# Patient Record
Sex: FEMALE | Race: WHITE
[De-identification: names, ages, dates, MRNs, and addresses within clinical notes are randomized per-mention and may not be internally consistent; named-entity substitution may affect disease eponyms.]

---

## 2017-01-13 ENCOUNTER — HOSPITAL ENCOUNTER (EMERGENCY)
Dept: HOSPITAL 74 - JER | Age: 47
LOS: 1 days | Discharge: HOME | End: 2017-01-14
Payer: COMMERCIAL

## 2017-01-13 VITALS — TEMPERATURE: 98.3 F | HEART RATE: 94 BPM | SYSTOLIC BLOOD PRESSURE: 126 MMHG | DIASTOLIC BLOOD PRESSURE: 88 MMHG

## 2017-01-13 VITALS — BODY MASS INDEX: 32.2 KG/M2

## 2017-01-13 DIAGNOSIS — R73.03: ICD-10-CM

## 2017-01-13 DIAGNOSIS — Z87.42: ICD-10-CM

## 2017-01-13 DIAGNOSIS — J45.909: ICD-10-CM

## 2017-01-13 DIAGNOSIS — K57.20: Primary | ICD-10-CM

## 2017-01-13 LAB
ALBUMIN SERPL-MCNC: 3.5 G/DL (ref 3.4–5)
ALP SERPL-CCNC: 67 U/L (ref 45–117)
ALT SERPL-CCNC: 25 U/L (ref 12–78)
ANION GAP SERPL CALC-SCNC: 7 MMOL/L (ref 8–16)
AST SERPL-CCNC: 11 U/L (ref 15–37)
BILIRUB SERPL-MCNC: 0.5 MG/DL (ref 0.2–1)
CALCIUM SERPL-MCNC: 8 MG/DL (ref 8.5–10.1)
CO2 SERPL-SCNC: 27 MMOL/L (ref 21–32)
CREAT SERPL-MCNC: 0.5 MG/DL (ref 0.55–1.02)
GLUCOSE SERPL-MCNC: 100 MG/DL (ref 74–106)
PROT SERPL-MCNC: 7 G/DL (ref 6.4–8.2)

## 2017-01-13 NOTE — PDOC
[id number]


ABD PAIN


Time Seen by Provider: 17 19:45





- History of Present Illness


Initial Comments: 


17 20:42


CHIEF COMPLAINT: Lower abdominal pain





HISTORY OF PRESENT ILLNESS: 46-year-old female with history of PID, 2 D&C, 

prediabetes, and asthma, presents to ED with pain in his lower abdomen. Patient 

states that 4 days ago she began feeling this discomfort. "I thought I was 

ovulating, but then it felt like gas in the stomach like I had after having a c-

section or something, now it feels like it's in my ovaries. Even when I sit 

down it feels like something is ready to pop."  She denies any sexual activity 

in the last 2 years but does report having chlamydia and PID "8-10 years ago."  

She also reports losing 13 pounds in the 2 months to help control her 

prediabetes.  Her LMP was .  





No recent travel or sick contacts. 





PAST MEDICAL HISTORY: Denies past medical history





FAMILY HISTORY: Denies





SOCIAL HISTORY: Lives at home with family.   Current smoker, 6-7 cigarettes 

daily.  Occasional alcoholuse.  Marijuana use once a week.





SURGICAL HISTORY: 2 D&C





ALLERGIES: No known drug allergies





REVIEW OF SYSTEMS


General/Constitutional: Intentional weight loss.  Denies fever or chills. 

Denies weakness, weight change.





HEENT: Denies change in vision. Denies ear pain or discharge. Denies sore 

throat.





Cardiovascular: Denies chest pain or shortness of breath.





Respiratory: Denies cough, wheezing, or hemoptysis.





Gastrointestinal: Denies nausea, vomiting, diarrhea or constipation. Denies 

rectal bleeding.





Genitourinary: Denies dysuria, frequency, or change in urination.





Musculoskeletal: Denies joint or muscle swelling or pain. Denies neck or back 

pain.





Skin and breasts: Denies rash or easy bruising.





Neurologic: Denies headache, vertigo, loss of consciousness, or loss of 

sensation.





Endocrine: Denies increased thirst. Denies abnormal weight change.





Hematologic/Lymphatic: Denies anemia, easy bleeding, or history of blood clots.





Allergic/Immunologic: Denies hives or skin allergy. Denies latex allergy.











PHYSICAL EXAM


General Appearance: Well-appearing, appropriately dressed.  No apparent distress

, no intoxication.





HEENT: EOMI, PERRLA, normal ENT inspection, normal voice, TMs normal, pharynx 

normal.  No conjunctival pallor.  No photophobia, scleral icterus.





Neck: Supple.  Trachea midline. No tenderness, rigidity, carotid bruit, stridor

, lymphadenopathy, or thyromegaly. 





Respiratory/Chest: Lungs CTAB.  No shortness of breath, chest tenderness, 

respiratory distress, accessory muscle use. No crackles, rales, rhonchi, stridor

, wheezing, dullness





Cardiovascular: RRR. S1, S2.  No JVD, murmur, bradycardia, tachycardia.





Vascular Pulses: Dorsalis-Pedis (R): 2+, Dorsalis-Pedis (L): 2+





Gastrointestinal/Abdominal: Tenderness to RLQ and LLQ on palpation. Normal 

bowel sounds.  Abdomen soft, non-distended.  No tenderness or rebound 

tenderness. No  organomegaly, pulsatile mass, guarding, hernia, hepatomegaly, 

splenomegaly.





Pelvic:


Left adnexal tenderness vs LLQ tenderness.


External genitalia normal without lesions.


Vaginal vault is clear without blood or discharge.


Cervix is long and closed.  No cervical motion tenderness.


Uterus is nontender and normal in size.





Lymphatic: No adenopathy, tenderness.





Musculoskeletal/Extremities:  Normal inspection. FROM of all extremities, 

normal capillary refill.  Pelvis Stable.  No CVA tenderness. No tenderness to 

extremities, pedal edema, swelling, erythema or deformity.





Integumentary: Appropriate color, dry, warm.  No cyanosis, erythema, jaundice 

or rash





Neurologic: CNs II-XII intact. Fully oriented, alert.  Appropriate mood/affect. 

Motor strength 5/5.  No appreciable EOM palsy, facial droop or sensory deficit.





17 00:17








Past History





- Past Medical History


Allergies/Adverse Reactions: 


 Allergies











Allergy/AdvReac Type Severity Reaction Status Date / Time


 


No Known Drug Allergies Allergy   Verified 17 18:48


 


peanut Allergy   Verified 17 18:52











Home Medications: 


Ambulatory Orders





No Home Medications 0 dose .ROUTE UTDICT 11/10/13 


Ciprofloxacin [Cipro -] 500 mg PO Q12H #20 tablet 17 


Metronidazole 500 mg PO TID #30 tablet 17 


Ondansetron [Zofran *Odt*] 8 mg SL BID PRN #10 od.tablet 17 








Anemia: No


Asthma: Yes


Cancer: No


Cardiac Disorders: No


CVA: No


COPD: No


CHF: No


Dementia: No


Diabetes: Yes (H/O , BS UNDER CONTROL WITH DIET/EXERCISE)


GI Disorders: No


 Disorders: No


HTN: No


Hypercholesterolemia: No


Liver Disease: Yes ("fatty liver")


Psychiatric Problems: Yes (ANXIETY)


Seizures: No


Thyroid Disease: No





- Surgical History


Abdominal Surgery: Yes (D&C FOR MISSED ABX2)


Appendectomy: No


Cardiac Surgery: No


Cholecystectomy: No


Lung Surgery: No


Neurologic Surgery: No


Orthopedic Surgery: No





- Reproductive History


 (#): 11


Para: 5


Therapeutic (s) & number: Yes (3)


Spontaneous : 3





- Immunization History


Immunization Up to Date: Yes





- Psycho/Social/Smoking Cessation Hx


Anxiety: No


Suicidal Ideation: No


Smoking Status: Yes


Smoking History: Current every day smoker


Have you smoked in the past 12 months: Yes


Number of Cigarettes Smoked Daily: 6


If you are a former smoker, when did you quit?: 


Information on smoking cessation initiated: Yes


'Breaking Loose' booklet given: 17


Hx Alcohol Use: No


Drug/Substance Use Hx: No


Substance Use Type: None


Hx Substance Use Treatment: No





*Physical Exam





- Vital Signs


 Last Vital Signs











Temp Pulse Resp BP Pulse Ox


 


 98.3 F   94 H  18   126/88   100 


 


 17 18:48  17 18:48  17 18:48  17 18:48  17 18:48














ED Treatment Course





- LABORATORY


CBC & Chemistry Diagram: 


 17 20:37





Medical Decision Making





- Medical Decision Making


17 22:17


6-year-old female with history of PID, 2 D&C, prediabetes, and asthma, presents 

to ED with pain in his lower abdomen. 





-CBC, CMP, lipase, UA, UCx, Upreg


-Abdomen & pelvis CT with contrast 





17 11:45





Urine pregnancy was not sent.  Patient agitated, states she would like to leave 

[facility].  Advised patient to remain in ED for CT, will order serum preg for quick 

results to send patient to CT. 





Serum pregnancy negative.  Patient to go to CT.  








CT suggestive of dievrticulitis. 





Will dischrage to home with Flagyl and Cipro. 





Advised patient to take meds as prescribed and f/u with GI.  Advised patient of 

signs and symptoms for return to ED; patient verbalized understnading and 

agrees to plan. 








*DC/Admit/Observation/Transfer


Diagnosis at time of Disposition: 


Diverticulitis


Qualifiers:


 Diverticulitis site: unspecified part of intestinal tract Diverticulitis 

bleeding: without bleeding Diverticulitis complication: without perforation or 

abscess Qualified Code(s): K57.92 - Diverticulitis of intestine, part 

unspecified, without perforation or abscess without bleeding





- Discharge Dispostion


Disposition: HOME


Admit: No





- Prescriptions


Prescriptions: 


Ciprofloxacin [Cipro -] 500 mg PO Q12H #20 tablet


Metronidazole 500 mg PO TID #30 tablet


Ondansetron [Zofran *Odt*] 8 mg SL BID PRN #10 od.tablet


 PRN Reason: Nausea And/Or Vomiting





- Referrals


Referrals: 


Omar Ludwig [Primary Care Provider] - 





- Patient Instructions


Printed Discharge Instructions:  DI for Diverticulitis


Additional Instructions: 


Please follow up with your primary care doctor next week.  If you experience 

fever, nausea, vomiting, diarrhea, rectal bleeding, dark stool, or any new or 

worsening symptoms, please return to the ER immediately.

## 2017-07-14 ENCOUNTER — HOSPITAL ENCOUNTER (EMERGENCY)
Dept: HOSPITAL 74 - JER | Age: 47
Discharge: HOME | End: 2017-07-14
Payer: COMMERCIAL

## 2017-07-14 VITALS — TEMPERATURE: 98.6 F | DIASTOLIC BLOOD PRESSURE: 72 MMHG | SYSTOLIC BLOOD PRESSURE: 123 MMHG

## 2017-07-14 VITALS — BODY MASS INDEX: 33.6 KG/M2

## 2017-07-14 VITALS — HEART RATE: 74 BPM

## 2017-07-14 DIAGNOSIS — F17.210: ICD-10-CM

## 2017-07-14 DIAGNOSIS — R00.2: Primary | ICD-10-CM

## 2017-07-14 DIAGNOSIS — F41.9: ICD-10-CM

## 2017-07-14 DIAGNOSIS — J45.909: ICD-10-CM

## 2017-07-14 DIAGNOSIS — E11.9: ICD-10-CM

## 2017-07-14 DIAGNOSIS — K76.0: ICD-10-CM

## 2017-07-14 NOTE — PDOC
History of Present Illness





- General


Chief Complaint: Palpitations


Stated Complaint: PALPITATIONS


Time Seen by Provider: 17 15:20





- History of Present Illness


Initial Comments: 


17 16:49


46 year old female with PMH of asthma, bipolar disorder, and anxiety disorder 

presenting with palpitations for the past two days. She describes these 

palpitations that she first experienced 3 years ago as random skipped heart 

beats that are more frequent before going to bed and that she notices at night. 

Denies any lightheadeness, presnycopal sensation, chest pain, or other sick 

symptoms. She has been evaluated on a holter monitor three years ago without 

finding any cardiac issue and has been symptom free until just two days ago. Of 

note her children are returning home this weekend and she has been preparing 

for them to return. Her PCP is Dr. Ludwig and cardiologist is Zenon.











Past History





- Past Medical History


Allergies/Adverse Reactions: 


 Allergies











Allergy/AdvReac Type Severity Reaction Status Date / Time


 


No Known Drug Allergies Allergy   Verified 17 15:05


 


peanut Allergy   Verified 17 15:05











Home Medications: 


Ambulatory Orders





No Home Medications 0 dose .ROUTE UTDICT 11/10/13 


Ciprofloxacin [Cipro -] 500 mg PO Q12H #20 tablet 17 


Metronidazole 500 mg PO TID #30 tablet 17 


Ondansetron [Zofran *Odt*] 8 mg SL BID PRN #10 od.tablet 17 








Anemia: No


Asthma: Yes


Cancer: No


Cardiac Disorders: No


CVA: No


COPD: No


CHF: No


Dementia: No


Diabetes: Yes (H/O , BS UNDER CONTROL WITH DIET/EXERCISE)


GI Disorders: No


 Disorders: No


HTN: No


Hypercholesterolemia: No


Liver Disease: Yes ("fatty liver")


Psychiatric Problems: Yes (ANXIETY)


Seizures: No


Thyroid Disease: No





- Surgical History


Abdominal Surgery: Yes (D&C FOR MISSED ABX2)


Appendectomy: No


Cardiac Surgery: No


Cholecystectomy: No


Lung Surgery: No


Neurologic Surgery: No


Orthopedic Surgery: No





- Reproductive History


 (#): 11


Para: 5


Therapeutic (s) & number: Yes (3)


Spontaneous : 3





- Immunization History


Immunization Up to Date: Yes





- Psycho/Social/Smoking Cessation Hx


Anxiety: No


Suicidal Ideation: No


Smoking Status: Yes


Smoking History: Current every day smoker


Have you smoked in the past 12 months: Yes


Number of Cigarettes Smoked Daily: 6


If you are a former smoker, when did you quit?: 


Information on smoking cessation initiated: No


'Breaking Loose' booklet given: 17


Hx Alcohol Use: No


Drug/Substance Use Hx: No


Substance Use Type: None


Hx Substance Use Treatment: No





**Review of Systems





- Review of Systems


Constitutional: No: Chills, Diaphoresis, Fever


HEENTM: No: Blurred Vision, Tearing, Recent change in vision


Respiratory: No: Cough, Orthopnea, Shortness of Breath


Cardiac (ROS): No: Chest Pain, Irregular Heart Rate, Lightheadedness


ABD/GI: No: Abdominal Distended, Constipated, Diarrhea, Difficulty Swallowing, 

Nausea, Poor Appetite


: No: Burning, Dysuria, Hematuria


Musculoskeletal: No: Back Pain, Joint Pain, Muscle Pain


Integumentary: No: Bruising, Change in Color, Erythema


Neurological: No: Headache, Numbness, Paresthesia


Psychiatric: Yes: Anxiety.  No: Change in Appetite


Endocrine: No: Excessive Sweating, Flushing


Hematologic/Lymphatic: No: Anemia, Easy Bruising





*Physical Exam





- Vital Signs


 Last Vital Signs











Temp Pulse Resp BP Pulse Ox


 


 99 F   74   16   142/73   98 


 


 17 15:06  17 15:46  17 15:46  17 15:06  17 15:46














- Physical Exam


General Appearance: Yes: Nourished, Appropriately Dressed.  No: Apparent 

Distress


HEENT: positive: EOMI, AB, Normal Voice


Neck: positive: Trachea midline.  negative: Tender


Respiratory/Chest: positive: Lungs Clear, Normal Breath Sounds.  negative: 

Chest Tender, Respiratory Distress, Accessory Muscle Use


Cardiovascular: positive: Regular Rhythm, Regular Rate, S1, S2.  negative: Edema

, JVD


Gastrointestinal/Abdominal: positive: Normal Bowel Sounds, Flat, Soft.  negative

: Tender, Organomegaly, Pulsatile Mass


Musculoskeletal: positive: Normal Inspection, CVA Tenderness


Extremity: positive: Normal Inspection, Normal Range of Motion


Neurologic: positive: Fully Oriented, Alert, Normal Mood/Affect





Medical Decision Making





- Medical Decision Making


 46 year old healthy female presenting with acute on chronic palpitations. 

These occurred last approximately three years ago and have no associated 

symptoms. She came in because they have been consistent over the past two days. 

EKG and tele monitor have not showed PVCs or any other arrythmia. We will 

discharge her with follow up with her PCP and cardiologist for another Holter 

monitor evaluation. Patient would also like to go home now because she needs to 

 her children.





17 16:59








*DC/Admit/Observation/Transfer


Diagnosis at time of Disposition: 


 Palpitations





- Discharge Dispostion


Disposition: HOME


Condition at time of disposition: Improved


Admit: No





- Referrals


Referrals: 


Omar Ludwig [Primary Care Provider] - 


Josiah Klein MD [Staff Physician] - 





- Patient Instructions


Printed Discharge Instructions:  DI for Palpitations


Additional Instructions: 


You were seen because you had some heart palpitations that we looked at on an 

EKG and did not see any evidence of abnormal heart rhythm. We believe that you 

should follow up with your cardiologist for this. Please return if your heart 

palpitations get worse or you have new symptoms. 





- Attestations


Physician Attestion: 


17 16:59








I, Dr. Teresita Talley, attest that this document has been prepared under my 

direction and personally reviewed by me in its entirety.   I further attest, 

that it accurately reflects all work, treatment, procedures and medical decision

-making performed by me.  





17 17:06

## 2017-07-14 NOTE — PDOC
Attending Attestation





- Resident


Resident Name: Teresita Talley





- ED Attending Attestation


I have performed the following: I have examined & evaluated the patient, The 

case was reviewed & discussed with the resident, I agree w/resident's findings 

& plan, Exceptions are as noted





- HPI


HPI: 


07/14/17 16:29


46y F hx of asthma presents with 3 days of itnermittent palpitations, pt feels 

a skipped beat occasionally throughout the day. Pt denies associated chest pain

, sob, lightheadedness, diarrhea, abd pain, back pain, headache, fever/chills, 

melena, bpr, weight loss, sweats, hairloss increased vaginal bleeding. Pt had 

workup from cardiology with holter monitor for the same sypmtoms that was 

negative.  





Constitutional - no reported Fever, Chills, 


HEENT: no reported vision changes, sore throat


Respiratory: no reported cough, sob, hemoptysis


Cardiac: +palpitations no reported chest pain, , light headedness, leg swelling


Abd/GI: no reported abd pain, nausea, vomiting, blood per rectum, melena, 

diarrhea


: no reported dysuria, frequency, discharge


Musculskelatal - no reported back pain, joint swelling


skin - no reported bruising, erythema, rash


neurological: no reported headache, numbness, focal weakness, tingling, ataxia, 


hematologic: no reported anemia, easy bruising, easy bleeding





GENERAL: The patient is awake, alert, and fully oriented, Nontoxic - in no 

acute distress.


HEAD: Normocephalic, atraumatic.


EYES: extraocular movements intact, sclera anicteric, conjunctiva clear.


ENT: Normal voice,  Moist mucous membranes.


NECK: Normal range of motion, supple


LUNGS: Breath sounds equal, clear to auscultation bilaterally.  No wheezes, no 

rhonchi, no rales.


HEART: Regular rate and rhythm, normal S1 and S2 without murmur, rub or gallop.


ABDOMEN: Soft, nontender, normoactive bowel sounds.  No guarding, no rebound.  

. No CVA tenderness


EXTREMITIES: Normal range of motion, no edema.  No clubbing or cyanosis. No 

cords, erythema, or tenderness.


NEUROLOGICAL: No facial assymetry, Normal speech, moving all 4 extremities 

spontaneously and symmetrically 


PSYCH: Normal mood, normal affect.


SKIN: Warm, Dry, normal turgor,











- Physicial Exam


PE: 





07/14/17 20:01


see abovfe





- Medical Decision Making


07/14/17 20:01


no signs of anemia, metabolic derengement


ekg wnl withotu signs of arrythmia


no cp/exertional symptoms to suggest angina cp


pt agrees with PMD/cards fu for holter


defer labs for now


return precautions were discussed








**Heart Score/ECG Review





- ECG Impressions


Comment:: 





07/14/17 16:48


Twelve-lead EKG was performed and reviewed by me. 


There is normal sinus rhythm with a normal rate. 


Rate of 79


The axis is normal. 


The intervals are normal. 


There is normal R wave progression


There are no ST or T wave abnormalities.





Impression: Normal twelve-lead EKG

## 2017-10-11 ENCOUNTER — HOSPITAL ENCOUNTER (EMERGENCY)
Dept: HOSPITAL 74 - JERFT | Age: 47
Discharge: HOME | End: 2017-10-11
Payer: COMMERCIAL

## 2017-10-11 VITALS — SYSTOLIC BLOOD PRESSURE: 131 MMHG | DIASTOLIC BLOOD PRESSURE: 71 MMHG | TEMPERATURE: 99 F | HEART RATE: 95 BPM

## 2017-10-11 VITALS — BODY MASS INDEX: 32.5 KG/M2

## 2017-10-11 DIAGNOSIS — F41.9: ICD-10-CM

## 2017-10-11 DIAGNOSIS — J45.909: ICD-10-CM

## 2017-10-11 DIAGNOSIS — F17.210: ICD-10-CM

## 2017-10-11 DIAGNOSIS — H60.333: Primary | ICD-10-CM

## 2017-10-11 DIAGNOSIS — E11.9: ICD-10-CM

## 2017-10-11 NOTE — PDOC
History of Present Illness





- General


Chief Complaint: Ear Problem


Stated Complaint: EAR PAIN


Time Seen by Provider: 10/11/17 10:46


History Source: Patient


Exam Limitations: No Limitations





- History of Present Illness


Initial Comments: 


10/11/17 11:02


Patient is a 46-year-old female, no significant medical history currently on no 

medication presents with bilateral ear pain feels that ear canals swelling.  

[place]  





Past Medical History: Denies.  





Allergies: No known allergies





Medications: 





Family History: Non-contributory





Social History: Denies smoking, alcohol use, or IVDU





Review of Systems


GENERAL/CONSTITUTIONAL: No fever or chills. No weakness. No weight change.


HEAD, EYES, EARS, NOSE AND THROAT: No change in vision. Bilateral ear pain with 

no discharge.   No sore throat. 


CARDIOVASCULAR: No chest pain or shortness of breath.


RESPIRATORY: No cough, wheezing, or hemoptysis.


GASTROINTESTINAL: No nausea, vomiting, diarrhea or constipation. No rectal 

bleeding.


GENITOURINARY: No dysuria, frequency, or change in urination.


MUSCULOSKELETAL: No joint or muscle swelling or pain. No neck or back pain.


SKIN AND BREASTS: No rash or easy bruising.


NEUROLOGIC: No headache, vertigo, loss of consciousness, or loss of sensation.


PSYCHIATRIC: No depression or anxiety.


ENDOCRINE: No increased thirst. No abnormal weight change.


HEMATOLOGIC/LYMPHATIC: No anemia, easy bleeding, or history of blood clots.


ALLERGIC/IMMUNOLOGIC: No hives or skin allergy. No latex allergy.





Physical Exam: 


GENERAL: The patient is awake, alert, and fully oriented, in no acute distress.


HEAD: Normal with no signs of trauma.


EYES: Pupils equal, round and reactive to light, extraocular movements intact, 

sclera anicteric, conjunctiva clear.


ENT: Bilateral ear canal edema, nares patent, oropharynx clear without 

exudates.  Moist mucous membranes. No uvula deviation


NECK: Normal range of motion, supple without lymphadenopathy, JVD, or masses.


LUNGS: Breath sounds equal, clear to auscultation bilaterally.  No wheezes, and 

no crackles.


HEART: Regular rate and rhythm, normal S1 and S2 without murmur, rub or gallop.


ABDOMEN: Soft, nontender, normoactive bowel sounds.  No guarding, no rebound.  

No masses. No bruising or abrasions


MUSCULOSKELETAL: Normal range of motion, no edema.  No clubbing or cyanosis. No 

cords, erythema, or tenderness. No CVA Tenderness with fist.


NEUROLOGICAL: Cranial nerves II through XII grossly intact.  Normal speech, 

normal gait.


SKIN: Warm, Dry, normal turgor, no rashes or lesions noted.





10/11/17 11:35








Past History





- Past Medical History


Allergies/Adverse Reactions: 


 Allergies











Allergy/AdvReac Type Severity Reaction Status Date / Time


 


No Known Drug Allergies Allergy   Verified 10/11/17 10:28


 


peanut Allergy   Verified 10/11/17 10:28











Home Medications: 


Ambulatory Orders





Amox-Tr/K Cl [Augmentin - 875Mg Tablet] 1 tab PO BID #14 tablet 10/11/17 


Ciprofloxacin HCl/Dexameth [Ciprodex Otic Suspension] 3 drop AU BID #1 bottle 10

/11/17 








Anemia: No


Asthma: Yes


Cancer: No


Cardiac Disorders: No


CVA: No


COPD: No


CHF: No


Dementia: No


Diabetes: Yes (H/O , BS UNDER CONTROL WITH DIET/EXERCISE)


GI Disorders: No


 Disorders: No


HTN: No


Hypercholesterolemia: No


Liver Disease: Yes ("fatty liver")


Psychiatric Problems: Yes (ANXIETY)


Seizures: No


Thyroid Disease: No





- Surgical History


Abdominal Surgery: Yes (D&C FOR MISSED ABX2)


Appendectomy: No


Cardiac Surgery: No


Cholecystectomy: No


Lung Surgery: No


Neurologic Surgery: No


Orthopedic Surgery: No





- Reproductive History


 (#): 11


Para: 5


Therapeutic (s) & number: Yes (3)


Spontaneous : 3





- Immunization History


Immunization Up to Date: Yes





- Suicide/Smoking/Psychosocial Hx


Smoking Status: Yes


Smoking History: Current every day smoker


Have you smoked in the past 12 months: Yes


Number of Cigarettes Smoked Daily: 5


If you are a former smoker, when did you quit?: 


Information on smoking cessation initiated: No


'Breaking Loose' booklet given: 10/11/17


Hx Alcohol Use: No


Drug/Substance Use Hx: No


Substance Use Type: None


Hx Substance Use Treatment: No





*Physical Exam





- Vital Signs


 Last Vital Signs











Temp Pulse Resp BP Pulse Ox


 


 99 F   95 H  18   131/71   98 


 


 10/11/17 10:25  10/11/17 10:25  10/11/17 10:25  10/11/17 10:25  10/11/17 10:25














Medical Decision Making





- Medical Decision Making


10/11/17 11:10


A/P: Patient here with bilateral otitis externa, will DC patient on Cipro based 

eardrops and Augmentin because of severity in bilateral canals. Follow-up with 

ENT.





I discussed the physical exam findings, ancillary test results and final 

diagnoses with the patient. I answered all of the patient's questions.


The patient was satisfied with the care received and felt comfortable with the 

discharge plan and treatment plan. 


The patient will call to arrange follow-up and will return to the Emergency 

Department with any new, persistent or worsening symptoms. 





10/11/17 11:35








*DC/Admit/Observation/Transfer


Diagnosis at time of Disposition: 


Otitis externa


Qualifiers:


 Otitis externa type: swimmer's ear Chronicity: acute Laterality: bilateral 

Qualified Code(s): H60.333 - Swimmer's ear, bilateral





- Discharge Dispostion


Disposition: HOME


Condition at time of disposition: Good


Admit: No





- Prescriptions


Prescriptions: 


Amox-Tr/K Cl [Augmentin - 875Mg Tablet] 1 tab PO BID #14 tablet


Ciprofloxacin HCl/Dexameth [Ciprodex Otic Suspension] 3 drop AU BID #1 bottle





- Referrals


Referrals: 


Timothy Evans MD [Staff Physician] - 





- Patient Instructions


Printed Discharge Instructions:  DI for Otitis Externa





- Post Discharge Activity


Forms/Work/School Notes:  Back to Work

## 2017-10-21 ENCOUNTER — HOSPITAL ENCOUNTER (EMERGENCY)
Dept: HOSPITAL 74 - JERFT | Age: 47
Discharge: HOME | End: 2017-10-21
Payer: COMMERCIAL

## 2017-10-21 VITALS — SYSTOLIC BLOOD PRESSURE: 144 MMHG | DIASTOLIC BLOOD PRESSURE: 77 MMHG | HEART RATE: 102 BPM | TEMPERATURE: 98.7 F

## 2017-10-21 VITALS — BODY MASS INDEX: 31.9 KG/M2

## 2017-10-21 DIAGNOSIS — F41.9: ICD-10-CM

## 2017-10-21 DIAGNOSIS — E11.9: ICD-10-CM

## 2017-10-21 DIAGNOSIS — J45.901: Primary | ICD-10-CM

## 2017-10-21 DIAGNOSIS — K76.0: ICD-10-CM

## 2017-10-21 PROCEDURE — 3E0F7GC INTRODUCTION OF OTHER THERAPEUTIC SUBSTANCE INTO RESPIRATORY TRACT, VIA NATURAL OR ARTIFICIAL OPENING: ICD-10-PCS

## 2017-10-21 NOTE — PDOC
History of Present Illness





- General


Chief Complaint: Respiratory


Stated Complaint: SOB (ASTHMA)


Time Seen by Provider: 10/21/17 10:01


History Source: Patient


Exam Limitations: No Limitations





- History of Present Illness


Initial Comments: 





10/21/17 10:26








My chief complaint: Productive cough, wheezing, shortness of breath 3 days








History of present illness: Patient is a 46-year-old female with a history of 

asthma, anxiety, diet-controlled diabetes, fatty liver here today complaining 

of productive cough with greenish phlegm, wheezing, shortness of breath even at 

rest for the last 3 days. Patient reports that she's been using the nebulizer 

every 4 hours and the pump in between nebulizer treatments with out relief of 

symptoms. Patient denies any fever, nausea vomiting diarrhea. Patient reports 

that her sister was sick with similar symptoms. Patient denies any chance of 

pregnancy has had no relations for 2 years.


Timing/Duration: getting worse (3 days )


Severity: moderate


Associated Symptoms: reports: cough (productive green), shortness of breath





Past History





- Past Medical History


Allergies/Adverse Reactions: 


 Allergies











Allergy/AdvReac Type Severity Reaction Status Date / Time


 


No Known Drug Allergies Allergy   Verified 10/21/17 09:49


 


peanut Allergy   Verified 10/21/17 09:49











Home Medications: 


Ambulatory Orders





Albuterol 0.083% Nebulizer Sol [Ventolin 0.083% Nebulizer Soln -] 1 neb NEB Q4H 

PRN #1 vial 10/21/17 


Albuterol Sulfate Inhaler - [Ventolin HFA Inhaler -] 2 inh PO Q4H PRN #1 inh 10/

21/17 


Azithromycin [Zithromax 250mg Tablets -] 250 mg PO UTDICT #6 tab 10/21/17 


Prednisone [Deltasone -] 20 mg PO BID #8 tablet 10/21/17 








Anemia: No


Asthma: Yes


Cancer: No


Cardiac Disorders: No


CVA: No


COPD: No


CHF: No


Dementia: No


Diabetes: Yes (H/O , BS UNDER CONTROL WITH DIET/EXERCISE)


GI Disorders: No


 Disorders: No


HTN: No


Hypercholesterolemia: No


Liver Disease: Yes ("fatty liver")


Psychiatric Problems: Yes (ANXIETY)


Seizures: No


Thyroid Disease: No





- Surgical History


Abdominal Surgery: Yes (D&C FOR MISSED ABX2)


Appendectomy: No


Cardiac Surgery: No


Cholecystectomy: No


Lung Surgery: No


Neurologic Surgery: No


Orthopedic Surgery: No





- Reproductive History


 (#): 11


Para: 5


Therapeutic (s) & number: Yes (3)


Spontaneous : 3





- Immunization History


Immunization Up to Date: Yes





- Suicide/Smoking/Psychosocial Hx


Smoking Status: Yes


Smoking History: Former smoker


Have you smoked in the past 12 months: Yes


Number of Cigarettes Smoked Daily: 5


If you are a former smoker, when did you quit?: 1 DAY AGO


Information on smoking cessation initiated: Yes


'Breaking Loose' booklet given: 10/21/17


Hx Alcohol Use: No


Drug/Substance Use Hx: No


Substance Use Type: None


Hx Substance Use Treatment: No





**Review of Systems





- Review of Systems


Able to Perform ROS?: Yes


Constitutional: No: Symptoms Reported


HEENTM: No: Symptoms Reported


Respiratory: Yes: Shortness of Breath, SOB with Exertion, SOB at Rest, Wheezing

, Productive cough (greenish for 3 days).  No: Orthopnea, Stridor


Cardiac (ROS): No: Symptoms Reported


ABD/GI: No: Symptoms Reported


: No: Symptoms Reported


Musculoskeletal: No: Symptoms Reported


Integumentary: No: Symptoms Reported


Neurological: No: Symptoms reported





*Physical Exam





- Vital Signs


 Last Vital Signs











Temp Pulse Resp BP Pulse Ox


 


 98.7 F   102 H  22   144/77   98 


 


 10/21/17 09:46  10/21/17 09:46  10/21/17 09:46  10/21/17 09:46  10/21/17 09:46














- Physical Exam


General Appearance: Yes: Appropriately Dressed


HEENT: positive: Normal ENT Inspection


Neck: negative: Lymphadenopathy (R), Lymphadenopathy (L)


Respiratory/Chest: positive: Crackles, Rhonchi, Wheezing (diffuse).  negative: 

Accessory Muscle Use, Labored Respiration


Cardiovascular: positive: Regular Rhythm, Regular Rate, S1, S2


Integumentary: positive: Normal Color


Neurologic: positive: Alert, Normal Response





Medical Decision Making





- Medical Decision Making


10/21/17 10:28


Patient is a 46-year-old female with a history of asthma, anxiety, diet-

controlled diabetes, fatty liver here today complaining of productive cough 

with greenish phlegm, wheezing, shortness of breath even at rest for the last 3 

days. Patient reports that she's been using the nebulizer every 4 hours and the 

pump in between nebulizer treatments with out relief of symptoms. Patient 

denies any fever, nausea vomiting diarrhea. Patient reports that her sister was 

sick with similar symptoms. Patient denies any chance of pregnancy has had no 

relations for 2 years.








Asthma exacerbation








Plan:





DuoNeb now


Prednisone 60 mg by mouth now then 20 mg twice a day for following 4 days


Azithromycin 250 mg 2 tabs today then 1 tab daily for following 4 days


XRAY CHEST PA/LATERAL NO INFILTRATE NOTED PER DR. LAL





10/21/17 11:35


ALBUTEROL HFA 2 PUFFS EVERY 4 HRS PRH WHEEZING/SOB





10/21/17 13:30











lungs CTA b/l after neb





*DC/Admit/Observation/Transfer


Diagnosis at time of Disposition: 


Asthma exacerbation


Qualifiers:


 Asthma severity: unspecified severity Asthma persistence: unspecified 

Qualified Code(s): J45.901 - Unspecified asthma with (acute) exacerbation





- Discharge Dispostion


Disposition: HOME


Condition at time of disposition: Stable





- Prescriptions


Prescriptions: 


Prednisone [Deltasone -] 20 mg PO BID #8 tablet


Albuterol 0.083% Nebulizer Sol [Ventolin 0.083% Nebulizer Soln -] 1 neb NEB Q4H 

PRN #1 vial


 PRN Reason: Short Of Breath/Wheezing


Albuterol Sulfate Inhaler - [Ventolin HFA Inhaler -] 2 inh PO Q4H PRN #1 inh


 PRN Reason: Short Of Breath/Wheezing


Azithromycin [Zithromax 250mg Tablets -] 250 mg PO UTDICT #6 tab





- Referrals


Referrals: 


Omar Ludwig [Primary Care Provider] - 





- Patient Instructions


Additional Instructions: 








DRINK A lot of fluids and rest








Follow-up with your primary care provider within days.











Return to emergency room if any difficulty breathing











Patient voiced understanding of discharge instructions and all questions were 

answered

## 2018-03-06 ENCOUNTER — HOSPITAL ENCOUNTER (EMERGENCY)
Dept: HOSPITAL 74 - JER | Age: 48
Discharge: HOME | End: 2018-03-06
Payer: COMMERCIAL

## 2018-03-06 VITALS — HEART RATE: 80 BPM | TEMPERATURE: 98 F | SYSTOLIC BLOOD PRESSURE: 122 MMHG | DIASTOLIC BLOOD PRESSURE: 71 MMHG

## 2018-03-06 VITALS — BODY MASS INDEX: 33.5 KG/M2

## 2018-03-06 DIAGNOSIS — M54.6: Primary | ICD-10-CM

## 2018-03-06 DIAGNOSIS — F41.9: ICD-10-CM

## 2018-03-06 DIAGNOSIS — J45.909: ICD-10-CM

## 2018-03-06 DIAGNOSIS — K76.0: ICD-10-CM

## 2018-03-06 DIAGNOSIS — E11.9: ICD-10-CM

## 2018-03-06 PROCEDURE — 3E0233Z INTRODUCTION OF ANTI-INFLAMMATORY INTO MUSCLE, PERCUTANEOUS APPROACH: ICD-10-PCS

## 2018-09-02 ENCOUNTER — HOSPITAL ENCOUNTER (EMERGENCY)
Dept: HOSPITAL 74 - JER | Age: 48
LOS: 1 days | Discharge: HOME | End: 2018-09-03
Payer: COMMERCIAL

## 2018-09-02 VITALS — TEMPERATURE: 98.7 F

## 2018-09-02 VITALS — BODY MASS INDEX: 32.9 KG/M2

## 2018-09-02 DIAGNOSIS — R07.89: Primary | ICD-10-CM

## 2018-09-02 DIAGNOSIS — E11.9: ICD-10-CM

## 2018-09-02 DIAGNOSIS — Z88.8: ICD-10-CM

## 2018-09-02 DIAGNOSIS — Z91.013: ICD-10-CM

## 2018-09-02 DIAGNOSIS — R76.11: ICD-10-CM

## 2018-09-03 VITALS — SYSTOLIC BLOOD PRESSURE: 141 MMHG | DIASTOLIC BLOOD PRESSURE: 86 MMHG | HEART RATE: 63 BPM

## 2018-09-03 LAB
ALBUMIN SERPL-MCNC: 3.6 G/DL (ref 3.4–5)
ALP SERPL-CCNC: 78 U/L (ref 45–117)
ALT SERPL-CCNC: 31 U/L (ref 12–78)
ANION GAP SERPL CALC-SCNC: 6 MMOL/L (ref 8–16)
APPEARANCE UR: (no result)
AST SERPL-CCNC: 23 U/L (ref 15–37)
BASOPHILS # BLD: 0.2 % (ref 0–2)
BILIRUB SERPL-MCNC: 0.2 MG/DL (ref 0.2–1)
BILIRUB UR STRIP.AUTO-MCNC: NEGATIVE MG/DL
BUN SERPL-MCNC: 9 MG/DL (ref 7–18)
CALCIUM SERPL-MCNC: 9 MG/DL (ref 8.5–10.1)
CHLORIDE SERPL-SCNC: 106 MMOL/L (ref 98–107)
CO2 SERPL-SCNC: 29 MMOL/L (ref 21–32)
COLOR UR: YELLOW
CREAT SERPL-MCNC: 0.7 MG/DL (ref 0.55–1.02)
DEPRECATED RDW RBC AUTO: 13.4 % (ref 11.6–15.6)
EOSINOPHIL # BLD: 1.9 % (ref 0–4.5)
GLUCOSE SERPL-MCNC: 102 MG/DL (ref 74–106)
HCT VFR BLD CALC: 35.4 % (ref 32.4–45.2)
HGB BLD-MCNC: 11.7 GM/DL (ref 10.7–15.3)
KETONES UR QL STRIP: NEGATIVE
LEUKOCYTE ESTERASE UR QL STRIP.AUTO: NEGATIVE
LYMPHOCYTES # BLD: 27.6 % (ref 8–40)
MCH RBC QN AUTO: 27.4 PG (ref 25.7–33.7)
MCHC RBC AUTO-ENTMCNC: 33 G/DL (ref 32–36)
MCV RBC: 83.2 FL (ref 80–96)
MONOCYTES # BLD AUTO: 6.9 % (ref 3.8–10.2)
NEUTROPHILS # BLD: 63.4 % (ref 42.8–82.8)
NITRITE UR QL STRIP: NEGATIVE
PH UR: 6 [PH] (ref 5–8)
PLATELET # BLD AUTO: 276 K/MM3 (ref 134–434)
PMV BLD: 8.9 FL (ref 7.5–11.1)
POTASSIUM SERPLBLD-SCNC: 3.9 MMOL/L (ref 3.5–5.1)
PROT SERPL-MCNC: 7.4 G/DL (ref 6.4–8.2)
PROT UR QL STRIP: NEGATIVE
PROT UR QL STRIP: NEGATIVE
RBC # BLD AUTO: 4.25 M/MM3 (ref 3.6–5.2)
SODIUM SERPL-SCNC: 141 MMOL/L (ref 136–145)
SP GR UR: 1.02 (ref 1–1.03)
UROBILINOGEN UR STRIP-MCNC: NEGATIVE MG/DL (ref 0.2–1)
WBC # BLD AUTO: 11 K/MM3 (ref 4–10)

## 2018-09-03 NOTE — PDOC
History of Present Illness





- General


History Source: Patient


Exam Limitations: No Limitations





<Cam Godinez - Last Filed: 18 01:40>





<FraciscoDawn Meera - Last Filed: 18 02:06>





- General


Chief Complaint: Chest Pain


Stated Complaint: CHEST PAIN


Time Seen by Provider: 18 00:07





- History of Present Illness


Initial Comments: 





18 01:35


The patient is a 47 year old female presenting with a friend, with a 

significant past medical history of pre-diabetes and TB (positive on PPD checks 

and x-rays are negative), who presents to the ED complaining of chest pain and 

abdominal pain for the past 2 days. She describes her chest pain as an 

intermittent tightness, ranging from mild to moderate, without radiation or 

modifying factors. She notes that she has been diaphoretic intermittently as 

well. She reports that she recently started 2 new medications, 4 days ago. One 

medication is a pill to reverse her positive TB tests and the other one is a 

weekly injection to lose weight called Semaglutide. 





The patient denies shortness of breath, headache and dizziness. Denies fever, 

chills, nausea, vomiting, diarrhea or constipation. Denies dysuria, frequency, 

urgency and hematuria. 





Allergies: None 


Past surgical history: D&Cs 


Social History: 20 + years of tobacco use (quit 6 months ago)





 (Cam Godinez)





Past History





<Cam Godinez - Last Filed: 18 01:40>





- Past Medical History


Anemia: No


Asthma: Yes


Cancer: No


Cardiac Disorders: No


CVA: No


COPD: No


CHF: No


DVT: No


Dementia: No


Diabetes: Yes (H/O , BS UNDER CONTROL WITH DIET/EXERCISE)


GI Disorders: No


 Disorders: No


HTN: No


Hypercholesterolemia: No


Liver Disease: Yes ("fatty liver")


Psychiatric Problems: Yes (ANXIETY)


Seizures: No


Thyroid Disease: No


Other medical history: PPD +x 26 years, gets xray [id number].





- Surgical History


Abdominal Surgery: Yes (D&C FOR MISSED ABX2)


Appendectomy: No


Cardiac Surgery: No


Cholecystectomy: No


Lung Surgery: No


Neurologic Surgery: No


Orthopedic Surgery: No





- Reproductive History


 (#): 11


Para: 5


Therapeutic (s) & number: Yes (3)


Spontaneous : 3





- Immunization History


Immunization Up to Date: Yes





- Suicide/Smoking/Psychosocial Hx


Smoking Status: Yes


Smoking History: Never smoked


Have you smoked in the past 12 months: Yes


Number of Cigarettes Smoked Daily: 7


If you are a former smoker, when did you quit?: 1 DAY AGO


Information on smoking cessation initiated: No


'Breaking Loose' booklet given: 10/21/17


Hx Alcohol Use: No


Drug/Substance Use Hx: No


Substance Use Type: None, Marijuana


Hx Substance Use Treatment: No





<Dawn Yap - Last Filed: 18 02:06>





- Past Medical History


Allergies/Adverse Reactions: 


 Allergies











Allergy/AdvReac Type Severity Reaction Status Date / Time


 


No Known Drug Allergies Allergy   Verified 18 09:47


 


peanut Allergy   Verified 18 09:47


 


shellfish derived Allergy   Verified 18 09:47











Home Medications: 


Ambulatory Orders





Albuterol 0.083% Nebulizer Sol [Ventolin 0.083% Nebulizer Soln -] 1 neb NEB Q4H 

PRN #1 vial 10/21/17 


Albuterol Sulfate Inhaler - [Ventolin HFA Inhaler -] 2 inh PO Q4H PRN #1 inh 10/

21/17 


Naproxen 500 mg PO BID PRN #20 tablet 18 


Tramadol HCl [Ultram] 50 mg PO TID PRN #10 tablet MDD 3 tabs 18 











Cardiac Specific PMH





- Complaint Specific PMHX


Pacemaker: No





<Dawn Yap - Last Filed: 18 02:06>





**Review of Systems





- Review of Systems


Able to Perform ROS?: Yes





<Cam Godinez - Last Filed: 18 01:40>





<Dawn Yap - Last Filed: 18 02:06>





- Review of Systems


Comments:: 





18 01:37


GENERAL/CONSTITUTIONAL: (+) Diaphoretic. No fever or chills. No weakness.


HEAD, EYES, EARS, NOSE AND THROAT: No change in vision. No ear pain or 

discharge. No sore throat.


GASTROINTESTINAL: (+) Abdominal pain. No nausea, vomiting, diarrhea or 

constipation.


GENITOURINARY: No dysuria, frequency, or change in urination.


CARDIOVASCULAR: (+) Chest pain. No shortness of breath.


RESPIRATORY: No cough, wheezing, or hemoptysis.


MUSCULOSKELETAL: No joint or muscle swelling or pain. No neck or back pain.


SKIN: No rash


NEUROLOGIC: No headache, vertigo, loss of consciousness, or change in strength/

sensation.


ENDOCRINE: No increased thirst. No abnormal weight change.


HEMATOLOGIC/LYMPHATIC: No anemia, easy bleeding, or history of blood clots.


ALLERGIC/IMMUNOLOGIC: No hives or skin allergy.


 (Cam Godinez)





*Physical Exam





<Cam Godinez - Last Filed: 18 01:40>





<FraciscoDawn Meera - Last Filed: 18 02:06>





- Vital Signs


 Last Vital Signs











Temp Pulse Resp BP Pulse Ox


 


 98.7 F   63   17   141/86   99 


 


 18 23:21  18 00:46  18 00:46  18 00:46  18 00:46














- Physical Exam


Comments: 





18 01:37





Constitutional: Awake, alert, oriented.  No acute distress.


Head:  Normocephalic.  Atraumatic


Eyes:  PERRL. EOMI.  Conjunctivae are not pale.


ENT:  Mucous membranes are moist and intact. Posterior pharynx without exudates 

or erythema. Uvula midline.


Neck:  Supple.  Full ROM. No lymphadenopathy. NO JVD. No Bruits. 


Cardiovascular:  Regular rate.  Regular rhythm. S1, S2 regular.  Distal pulses 

are 2+ and symmetric.  


Pulmonary/Chest:  No evidence of respiratory distress.  Clear to auscultation 

bilaterally  No wheezing, rales or rhonchi.


Abdominal:  Protuberant, Soft and non-distended.  There is no tenderness.  No 

rebound, guarding or rigidity.  No organomegaly. No palpable masses. Good bowel 

sounds.


Back:  No CVA tenderness.


Musculoskeletal:  No edema.  No cyanosis.  No clubbing.  Full range of motion 

in all extremities.  Nocalf tenderness. Radial/pedal pulses are intact and 2+ 

bilaterally


Skin:  Skin is warm and dry.  No petechiae.  No purpura.  


Neurological:  Alert and oriented to person, place, and time.  Cranial nerves II

-XII are grossly intact. Normal speech. Strength is grossly symmetric. No 

sensory deficits.


Psychiatric:  Good eye contact.  Normal interaction, affect and behavior.





 (Cam Godinez)





ED Treatment Course





- LABORATORY


CBC & Chemistry Diagram: 


 18 00:40





 18 00:40





<Cam Godinez - Last Filed: 18 01:40>





- LABORATORY


CBC & Chemistry Diagram: 


 18 00:40





 18 00:40





<Dawn Yap - Last Filed: 18 02:06>





- ADDITIONAL ORDERS


Additional order review: 


 Laboratory  Results











  18





  01:32 00:40 00:40


 


Sodium    141


 


Potassium    3.9


 


Chloride    106


 


Carbon Dioxide    29


 


Anion Gap    6 L


 


BUN    9


 


Creatinine    0.7


 


Creat Clearance w eGFR    > 60


 


Random Glucose    102


 


Calcium    9.0


 


Total Bilirubin    0.2


 


AST    23


 


ALT    31


 


Alkaline Phosphatase    78


 


Troponin I    < 0.02


 


Total Protein    7.4


 


Albumin    3.6


 


Serum Pregnancy, Qual   Negative 


 


Urine Color  Yellow  


 


Urine Appearance  Cloudy  


 


Urine pH  6.0  


 


Ur Specific Gravity  1.019  


 


Urine Protein  Negative  


 


Urine Glucose (UA)  Negative  


 


Urine Ketones  Negative  


 


Urine Blood  Negative  


 


Urine Nitrite  Negative  


 


Urine Bilirubin  Negative  


 


Urine Urobilinogen  Negative  


 


Ur Leukocyte Esterase  Negative  








 











  18





  00:40


 


RBC  4.25


 


MCV  83.2


 


MCHC  33.0


 


RDW  13.4


 


MPV  8.9


 


Neutrophils %  63.4


 


Lymphocytes %  27.6  D


 


Monocytes %  6.9


 


Eosinophils %  1.9


 


Basophils %  0.2














Medical Decision Making





<Cam Godinez - Last Filed: 18 01:40>





<Dawn Yap - Last Filed: 18 02:06>





- Medical Decision Making





18 02:01


ekg nsr with no signs of ischmiea


neg trop


cbc no anemia,no leukocytosis


chemistries  unremarkable


UA  negative





pt recently started  injectable semaglutide and maybe having intermittent 

hypoglycemic episodes


IMP PALPITATIONS, NEW MEDICATIONS


PLAN return to her PCP this week  (Dawn Yap)





*DC/Admit/Observation/Transfer





<Cam Godinez - Last Filed: 18 01:40>





<Dawn Yap - Last Filed: 18 02:06>


Diagnosis at time of Disposition: 


 Chest tightness








- Discharge Dispostion


Disposition: HOME


Condition at time of disposition: Stable





- Referrals


Referrals: 


Omar Ludwig [Primary Care Provider] - 





- Patient Instructions


Printed Discharge Instructions:  DI for Atypical Chest Pain


Additional Instructions: 


please follow up with your primary care physician this week


return if you experience any worsening symptoms





- Post Discharge Activity





- Attestations


Scribe Attestion: 





18 01:37





Documentation prepared by Cam Godinez, acting as medical scribe for 

Dawn Yap MD (Gretchen,Cam Dinh)

## 2018-09-04 NOTE — EKG
Test Reason : 

Blood Pressure : ***/*** mmHG

Vent. Rate : 085 BPM     Atrial Rate : 085 BPM

   P-R Int : 124 ms          QRS Dur : 084 ms

    QT Int : 386 ms       P-R-T Axes : 021 009 008 degrees

   QTc Int : 459 ms

 

NORMAL SINUS RHYTHM

MINIMAL VOLTAGE CRITERIA FOR LVH, MAY BE NORMAL VARIANT

BORDERLINE ECG

WHEN COMPARED WITH ECG OF 14-JUL-2017 15:03,

NO SIGNIFICANT CHANGE WAS FOUND

Confirmed by Pee Roy ([id number]) on 9/4/2018 4:31:29 PM

 

Referred By:             Confirmed By:Pee Roy

## 2019-03-27 ENCOUNTER — HOSPITAL ENCOUNTER (EMERGENCY)
Dept: HOSPITAL 74 - JERFT | Age: 49
Discharge: HOME | End: 2019-03-27
Payer: COMMERCIAL

## 2019-03-27 VITALS — HEART RATE: 83 BPM | SYSTOLIC BLOOD PRESSURE: 123 MMHG | DIASTOLIC BLOOD PRESSURE: 61 MMHG | TEMPERATURE: 98.7 F

## 2019-03-27 VITALS — BODY MASS INDEX: 34.7 KG/M2

## 2019-03-27 DIAGNOSIS — Z87.891: ICD-10-CM

## 2019-03-27 DIAGNOSIS — E11.9: ICD-10-CM

## 2019-03-27 DIAGNOSIS — J06.9: Primary | ICD-10-CM

## 2019-03-27 DIAGNOSIS — F41.9: ICD-10-CM

## 2019-03-27 DIAGNOSIS — K76.0: ICD-10-CM

## 2019-03-27 DIAGNOSIS — R42: ICD-10-CM

## 2019-03-27 NOTE — PDOC
History of Present Illness





- General


Chief Complaint: Lightheaded


Stated Complaint: RT EAR PAIN


Time Seen by Provider: 19 12:09





- History of Present Illness


Initial Comments: 





19 12:17


48-year-old female with a past medical history of diabetes, she's also on some 

denies it for positive TB test presents for evaluation of dizziness 3 days. 

She does have a history of vertigo. No systemic symptoms.





Past History





- Past Medical History


Allergies/Adverse Reactions: 


 Allergies











Allergy/AdvReac Type Severity Reaction Status Date / Time


 


No Known Drug Allergies Allergy   Verified 19 11:27


 


peanut Allergy   Verified 19 11:27


 


shellfish derived Allergy   Verified 19 11:27











Home Medications: 


Ambulatory Orders





Budesonide [Rhinocort Allergy] 1 spray NS ONCE #1 spray.pump 19 


Cetirizine HCl/Pseudoephedrine [Zyrtec-D Tablet] 1 each PO DAILY #30 tab.er.12h 

19 


Meclizine HCl [Antivert -] 12.5 mg PO TID PRN #21 tablet 19 


Metformin HCl [Glucophage] 500 mg PO BID 19 


Rifampin/Isoniazid [Rifamate Capsule] 1 each PO DAILY 19 


Semaglutide [Ozempic] 1 mg SQ WEEKLY 19 








Anemia: No


Asthma: Yes


Cancer: No


Cardiac Disorders: No


CVA: No


COPD: No


CHF: No


DVT: No


Dementia: No


Diabetes: Yes (H/O , BS UNDER CONTROL WITH DIET/EXERCISE)


GI Disorders: No


 Disorders: No


HTN: No


Hypercholesterolemia: No


Liver Disease: Yes ("fatty liver")


Psychiatric Problems: Yes (ANXIETY)


Seizures: No


Thyroid Disease: No


Other medical history: VERTIGO





- Surgical History


Abdominal Surgery: Yes (D&C FOR MISSED ABX2)


Appendectomy: No


Cardiac Surgery: No


Cholecystectomy: No


Lung Surgery: No


Neurologic Surgery: No


Orthopedic Surgery: No





- Reproductive History


 (#): 11


Para: 5


Therapeutic (s) & number: Yes (3)


Spontaneous : 3





- Immunization History


Immunization Up to Date: Yes





- Suicide/Smoking/Psychosocial Hx


Smoking Status: Yes


Smoking History: Former smoker


Have you smoked in the past 12 months: No


Number of Cigarettes Smoked Daily: 7


If you are a former smoker, when did you quit?: 6 MONTHS AGO


Information on smoking cessation initiated: No


'Breaking Loose' booklet given: 10/21/17


Hx Alcohol Use: No


Drug/Substance Use Hx: Yes (Marijuana)


Substance Use Type: Marijuana


Hx Substance Use Treatment: No





**Review of Systems





- Review of Systems


Constitutional: No: Fever


HEENTM: Yes: Nose Congestion


Respiratory: No: Cough


Neurological: Yes: See HPI, Dizziness.  No: Headache





*Physical Exam





- Vital Signs


 Last Vital Signs











Temp Pulse Resp BP Pulse Ox


 


 98.7 F   83   18   123/61   99 


 


 19 11:27  19 11:27  19 11:27  19 11:27  19 11:27














- Physical Exam


Comments: 





19 12:21


HEAD: NC/AT


EYES: Conjuntiva clear


Ears: Canals and TM's normal


NOSE: No d/c


THROAT: Moist mucous membrances, oral pharanx clear, uvula midline


NECK: Supple without adenopathy


CARDIAC: S1 S2


LUNGS: CTA Full and Equal breath sounds


ABDOMEN: Soft NT ND


MS: Full ROM in all joints without edema 


NEUROLOGIC: No gross sensory or motor deficits, NVID


SKIN: Normal color and temperature no lesions or rashes





Medical Decision Making





- Medical Decision Making





19 12:21


48-year-old with with history of prior symptoms of vertigo. Usually related 

with an upper respiratory viral infection. She is requesting a prescription for 

meclizine which I'm happy to give her. This is typical of her usual symptoms as 

per her history.





*DC/Admit/Observation/Transfer


Diagnosis at time of Disposition: 


 Upper respiratory infection, viral, Vertigo








- Discharge Dispostion


Disposition: HOME


Condition at time of disposition: Stable


Decision to Admit order: No





- Referrals


Referrals: 


Omar Ludwig [Primary Care Provider] - 





- Patient Instructions


Printed Discharge Instructions:  Vertigo, DI for Vertigo, DI for Viral Upper 

Respiratory Infection -- Adult


Additional Instructions: 


Return to the emergency room for worsening symptoms. Please take the Antivert 

as directed and follow-up with your primary care physician in one to 2 days for 

further evaluation and treatment options.If you're dizziness is due to an upper 

respiratory infection I've given you a nasal spray as well as a decongestant 

which will help with those symptoms as well. You cannot take the decongestant 

with the meclizine Antivert. Try the decongestant first and if that doesn't 

help you 12 hours later you may take the Antivert. Only take the medication as 

directed do not take both at the same time. There needs to be a 12 hour From 

when he take the decongestant and meclizine.





- Post Discharge Activity

## 2019-03-29 ENCOUNTER — HOSPITAL ENCOUNTER (EMERGENCY)
Dept: HOSPITAL 74 - JER | Age: 49
Discharge: HOME | End: 2019-03-29
Payer: COMMERCIAL

## 2019-03-29 VITALS — BODY MASS INDEX: 34.5 KG/M2

## 2019-03-29 VITALS — SYSTOLIC BLOOD PRESSURE: 123 MMHG | HEART RATE: 85 BPM | TEMPERATURE: 98.3 F | DIASTOLIC BLOOD PRESSURE: 72 MMHG

## 2019-03-29 DIAGNOSIS — R11.2: Primary | ICD-10-CM

## 2019-03-29 DIAGNOSIS — E11.9: ICD-10-CM

## 2019-03-29 DIAGNOSIS — Z87.891: ICD-10-CM

## 2019-03-29 DIAGNOSIS — J45.909: ICD-10-CM

## 2019-03-29 LAB
ALBUMIN SERPL-MCNC: 3.6 G/DL (ref 3.4–5)
ALP SERPL-CCNC: 70 U/L (ref 45–117)
ALT SERPL-CCNC: 38 U/L (ref 13–61)
ANION GAP SERPL CALC-SCNC: 4 MMOL/L (ref 8–16)
APPEARANCE UR: CLEAR
AST SERPL-CCNC: 23 U/L (ref 15–37)
BASOPHILS # BLD: 0.7 % (ref 0–2)
BILIRUB SERPL-MCNC: 0.4 MG/DL (ref 0.2–1)
BILIRUB UR STRIP.AUTO-MCNC: NEGATIVE MG/DL
BUN SERPL-MCNC: 10 MG/DL (ref 7–18)
CALCIUM SERPL-MCNC: 9.2 MG/DL (ref 8.5–10.1)
CHLORIDE SERPL-SCNC: 103 MMOL/L (ref 98–107)
CO2 SERPL-SCNC: 27 MMOL/L (ref 21–32)
COLOR UR: YELLOW
CREAT SERPL-MCNC: 0.5 MG/DL (ref 0.55–1.3)
DEPRECATED RDW RBC AUTO: 13.4 % (ref 11.6–15.6)
EOSINOPHIL # BLD: 1.8 % (ref 0–4.5)
GLUCOSE SERPL-MCNC: 89 MG/DL (ref 74–106)
HCT VFR BLD CALC: 37 % (ref 32.4–45.2)
HGB BLD-MCNC: 12 GM/DL (ref 10.7–15.3)
KETONES UR QL STRIP: NEGATIVE
LEUKOCYTE ESTERASE UR QL STRIP.AUTO: NEGATIVE
LIPASE SERPL-CCNC: 128 U/L (ref 73–393)
LYMPHOCYTES # BLD: 16.5 % (ref 8–40)
MCH RBC QN AUTO: 27 PG (ref 25.7–33.7)
MCHC RBC AUTO-ENTMCNC: 32.4 G/DL (ref 32–36)
MCV RBC: 83.1 FL (ref 80–96)
MONOCYTES # BLD AUTO: 5.7 % (ref 3.8–10.2)
NEUTROPHILS # BLD: 75.3 % (ref 42.8–82.8)
NITRITE UR QL STRIP: NEGATIVE
PH UR: 5.5 [PH] (ref 5–8)
PLATELET # BLD AUTO: 282 K/MM3 (ref 134–434)
PMV BLD: 8.8 FL (ref 7.5–11.1)
POTASSIUM SERPLBLD-SCNC: 4 MMOL/L (ref 3.5–5.1)
PROT SERPL-MCNC: 7.1 G/DL (ref 6.4–8.2)
PROT UR QL STRIP: NEGATIVE
PROT UR QL STRIP: NEGATIVE
RBC # BLD AUTO: 4.45 M/MM3 (ref 3.6–5.2)
SODIUM SERPL-SCNC: 135 MMOL/L (ref 136–145)
SP GR UR: 1.02 (ref 1.01–1.03)
UROBILINOGEN UR STRIP-MCNC: 0.2 MG/DL (ref 0.2–1)
WBC # BLD AUTO: 12 K/MM3 (ref 4–10)

## 2019-03-29 PROCEDURE — 3E0333Z INTRODUCTION OF ANTI-INFLAMMATORY INTO PERIPHERAL VEIN, PERCUTANEOUS APPROACH: ICD-10-PCS

## 2019-03-29 PROCEDURE — 3E0337Z INTRODUCTION OF ELECTROLYTIC AND WATER BALANCE SUBSTANCE INTO PERIPHERAL VEIN, PERCUTANEOUS APPROACH: ICD-10-PCS

## 2019-03-29 PROCEDURE — 3E033GC INTRODUCTION OF OTHER THERAPEUTIC SUBSTANCE INTO PERIPHERAL VEIN, PERCUTANEOUS APPROACH: ICD-10-PCS

## 2019-03-29 NOTE — PDOC
History of Present Illness





<Ana Cristina Welch - Last Filed: 19 13:01>





- General


History Source: Patient


Exam Limitations: No Limitations





- History of Present Illness


Travel History: No


Initial Comments: 





19 12:08


48-year-old female presents here emergency room complaints of epigastric 

burning accompanied with nausea vomiting  since yesterday evening. Patient 

states ate [employer] and within 6 hours symptoms began. Patient denies 

history of gallstones or kidney stones but does state had history of 

diverticulitis. Patient denies any bowel complaints. Patient denies fever, 

chills recent travel, or recent illness.





Timing/Duration: reports: intermittent


Quality: reports: mild, burning


Abdominal Pain Onset Location: reports: epigastric


Pain Radiation: reports: no radiation


Activities at Onset: reports: none


Aggravating Factors: improves with: None


Alleviating Factors: improves with: None





<Hanny Trivedi - Last Filed: 19 13:42>





- General


Chief Complaint: Pain


Stated Complaint: ABD PAIN


Time Seen by Provider: 19 11:51





Past History





<Ana Cristina Welch - Last Filed: 19 13:01>





- Travel


Traveled outside of the country in the last 30 days: No


Close contact w/someone who was outside of country & ill: No





- Past Medical History


Anemia: No


Asthma: Yes


Cancer: No


Cardiac Disorders: No


CVA: No


COPD: No


CHF: No


DVT: No


Dementia: No


Diabetes: Yes (H/O , BS UNDER CONTROL WITH DIET/EXERCISE)


GI Disorders: No


 Disorders: No


HTN: No


Hypercholesterolemia: No


Liver Disease: Yes ("fatty liver")


Psychiatric Problems: Yes (ANXIETY)


Seizures: No


Thyroid Disease: No





- Surgical History


Abdominal Surgery: Yes (D&C FOR MISSED ABX2)


Appendectomy: No


Cardiac Surgery: No


Cholecystectomy: No


Lung Surgery: No


Neurologic Surgery: No


Orthopedic Surgery: No





- Reproductive History


 (#): 11


Para: 5


Therapeutic (s) & number: Yes (3)


Spontaneous : 3





- Immunization History


Immunization Up to Date: Yes





- Suicide/Smoking/Psychosocial Hx


Smoking Status: Yes


Smoking History: Former smoker


Have you smoked in the past 12 months: No


Number of Cigarettes Smoked Daily: 7


If you are a former smoker, when did you quit?: 6 MONTHS AGO


Information on smoking cessation initiated: No


'Breaking Loose' booklet given: 10/21/17


Hx Alcohol Use: No


Drug/Substance Use Hx: No


Substance Use Type: Marijuana


Hx Substance Use Treatment: No


Patient Lives Alone: No


Lives with/in: spouse/SO





<Hanny Trivedi - Last Filed: 19 13:42>





- Past Medical History


Allergies/Adverse Reactions: 


 Allergies











Allergy/AdvReac Type Severity Reaction Status Date / Time


 


No Known Drug Allergies Allergy   Verified 19 11:36


 


peanut Allergy   Verified 19 11:36


 


shellfish derived Allergy   Verified 19 11:36











Home Medications: 


Ambulatory Orders





Metformin HCl [Glucophage] 500 mg PO BID 19 


Rifampin/Isoniazid [Rifamate Capsule] 1 each PO DAILY 19 


Ondansetron HCl [Zofran] 4 mg PO TID PRN #12 tablet 19 











**Review of Systems





- Review of Systems


Able to Perform ROS?: No


Is the patient limited English proficient: No


Constitutional: No: Symptoms Reported


HEENTM: No: Symptoms Reported


Respiratory: No: Symptoms reported


Cardiac (ROS): No: Symptoms Reported


ABD/GI: Yes: Nausea, Vomiting, Abdominal cramping


: No: Symptoms Reported


Musculoskeletal: No: Symptoms Reported


Integumentary: No: Symptoms Reported


Neurological: No: Symptoms reported


Endocrine: No: Symptoms Reported





<Hanny Trivedi - Last Filed: 19 13:42>





*Physical Exam





- Vital Signs


 Last Vital Signs











Temp Pulse Resp BP Pulse Ox


 


 98.3 F   85   18   123/72   100 


 


 19 11:34  19 11:34  19 11:34  19 11:34  19 11:34














<Ana Cristina Welch - Last Filed: 19 13:01>





- Vital Signs


 Last Vital Signs











Temp Pulse Resp BP Pulse Ox


 


 98.3 F   85   18   123/72   100 


 


 19 11:34  19 11:34  19 11:34  19 11:34  19 11:34














- Physical Exam


General Appearance: Yes: Nourished, Appropriately Dressed.  No: Apparent 

Distress


HEENT: positive: EOMI, AB, Pharynx Normal.  negative: Pale Conjunctivae


Neck: positive: Supple


Respiratory/Chest: positive: Lungs Clear, Normal Breath Sounds.  negative: 

Respiratory Distress, Accessory Muscle Use


Cardiovascular: positive: Regular Rhythm, Regular Rate.  negative: Murmur


Gastrointestinal/Abdominal: positive: Soft, Tenderness (epigatric and lower 

periunmbilical)


Integumentary: positive: Normal Color, Warm, Moist


Neurologic: positive: Motor Strength 5/5 (ambulatory)





<Hanny Trivedi - Last Filed: 19 13:42>





ED Treatment Course





- LABORATORY


CBC & Chemistry Diagram: 


 19 12:32





 19 12:32





- ADDITIONAL ORDERS


Additional order review: 


 Laboratory  Results











  19





  12:12


 


Urine Color  Yellow


 


Urine Appearance  Clear


 


Urine pH  5.5


 


Ur Specific Gravity  1.021


 


Urine Protein  Negative


 


Urine Glucose (UA)  Negative


 


Urine Ketones  Negative


 


Urine Blood  Negative


 


Urine Nitrite  Negative


 


Urine Bilirubin  Negative


 


Urine Urobilinogen  0.2


 


Ur Leukocyte Esterase  Negative


 


Urine HCG, Qual  Negative














- Medications


Given in the ED: 


ED Medications














Discontinued Medications














Generic Name Dose Route Start Last Admin





  Trade Name Freq  PRN Reason Stop Dose Admin


 


Pantoprazole Sodium 40 mg/  100 mls @ 200 mls/hr  19 12:01  19 12:39





  Sodium Chloride  IVPB  19 12:30  200 mls/hr





  ONCE ONE   Administration





     





     





     





     


 


Sodium Chloride  1,000 mls @ 1,000 mls/hr  19 12:01  19 12:39





  Normal Saline -  IV  19 13:00  1,000 mls/hr





  ASDIR STA   Administration





     





     





     





     


 


Ketorolac Tromethamine  30 mg  19 12:01  19 12:40





  Toradol Injection -  IVPUSH  19 12:02  30 mg





  ONCE ONE   Administration





     





     





     





     


 


Ondansetron HCl  4 mg  19 12:01  19 12:40





  Zofran Injection  IVPUSH  19 12:02  4 mg





  ONCE ONE   Administration





     





     





     





     














<Ana Cristina Welch - Last Filed: 19 13:01>





- LABORATORY


CBC & Chemistry Diagram: 


 19 12:32





 19 12:32





<Hanny Trivedi - Last Filed: 19 13:42>





Medical Decision Making





- Medical Decision Making





The patient was seen and evaluated in conjunction with midlevel provider under 

my direct supervision, ancillary studies were reviewed.  I agree with the plan 

as outlined by AD Trivedi. HPI, workup/dispo as outlined. VS reviewed, wnl.


labs and lytes, meds and reassess


19 13:01








<Ana Cristina Welch - Last Filed: 19 13:01>





- Medical Decision Making





19 12:47


CC: nausea vomiting upper abdominal cramping since last night at around 12 

midnight. Patient still complaining of nausea and abdominal pain.


Exam. Patient with epigastric and  upper periumbilical tenderness


Plan: Labs, urine, IV fluids, protonix, Zofran and Toradol


19 13:57


 Laboratory Tests











  19





  12:32


 


Sodium  135 L


 


Potassium  4.0


 


Chloride  103


 


Carbon Dioxide  27


 


Anion Gap  4 L


 


BUN  10


 


Creatinine  0.5 L


 


Random Glucose  89


 


Calcium  9.2


 


Total Bilirubin  0.4


 


AST  23


 


ALT  38


 


Alkaline Phosphatase  70


 


Total Protein  7.1


 


Albumin  3.6


 


Lipase  128








 Laboratory Tests











  19





  12:32


 


WBC  12.0 H


 


Hgb  12.0


 


Hct  37.0


 


Absolute Neuts (auto)  9.0 H


 


Neutrophils %  75.3








Pt states feeling better. Will discharge home with Zofran.











<Hanny Trivedi - Last Filed: 19 13:42>





*DC/Admit/Observation/Transfer





<Ana Cristina Welch - Last Filed: 19 13:01>





<Hanny Trivedi - Last Filed: 19 13:42>


Diagnosis at time of Disposition: 


 Nausea & vomiting








- Discharge Dispostion


Disposition: HOME


Condition at time of disposition: Improved





- Prescriptions


Prescriptions: 


Ondansetron HCl [Zofran] 4 mg PO TID PRN #12 tablet


 PRN Reason: Nausea And/Or Vomiting





- Referrals


Referrals: 


Omar Ludwig [Primary Care Provider] - 





- Patient Instructions


Printed Discharge Instructions:  Nausea and Vomiting-Adult


Additional Instructions: 


Please take Zofran as needed for nausea.


Follow-up bland diet for the next 48 hours and advance as tolerated.


If symptoms return or worsen please go to the nearest ER.


otherwise follow-up with your doctor.

## 2020-10-17 ENCOUNTER — HOSPITAL ENCOUNTER (EMERGENCY)
Facility: HOSPITAL | Age: 50
Discharge: HOME | End: 2020-10-17
Attending: EMERGENCY MEDICINE | Admitting: EMERGENCY MEDICINE

## 2020-10-17 ENCOUNTER — APPOINTMENT (EMERGENCY)
Dept: CT IMAGING | Facility: HOSPITAL | Age: 50
End: 2020-10-17

## 2020-10-17 ENCOUNTER — APPOINTMENT (EMERGENCY)
Dept: ULTRASOUND IMAGING | Facility: HOSPITAL | Age: 50
End: 2020-10-17

## 2020-10-17 VITALS
WEIGHT: 196.21 LBS | TEMPERATURE: 98.1 F | RESPIRATION RATE: 16 BRPM | OXYGEN SATURATION: 95 % | DIASTOLIC BLOOD PRESSURE: 67 MMHG | HEART RATE: 77 BPM | SYSTOLIC BLOOD PRESSURE: 148 MMHG

## 2020-10-17 DIAGNOSIS — N83.202 LEFT OVARIAN CYST: ICD-10-CM

## 2020-10-17 DIAGNOSIS — R10.9 ABDOMINAL PAIN: Primary | ICD-10-CM

## 2020-10-17 LAB
ALBUMIN SERPL BCP-MCNC: 3.6 G/DL (ref 3.5–5)
ALP SERPL-CCNC: 75 U/L (ref 46–116)
ALT SERPL W P-5'-P-CCNC: 38 U/L (ref 12–78)
ANION GAP SERPL CALCULATED.3IONS-SCNC: 10 MMOL/L (ref 4–13)
AST SERPL W P-5'-P-CCNC: 34 U/L (ref 5–45)
BACTERIA UR QL AUTO: ABNORMAL /HPF
BASOPHILS # BLD AUTO: 0.09 THOUSANDS/ΜL (ref 0–0.1)
BASOPHILS NFR BLD AUTO: 1 % (ref 0–1)
BILIRUB SERPL-MCNC: 0.4 MG/DL (ref 0.2–1)
BILIRUB UR QL STRIP: NEGATIVE
BUN SERPL-MCNC: 16 MG/DL (ref 5–25)
CALCIUM SERPL-MCNC: 9.4 MG/DL (ref 8.3–10.1)
CHLORIDE SERPL-SCNC: 104 MMOL/L (ref 100–108)
CLARITY UR: ABNORMAL
CO2 SERPL-SCNC: 24 MMOL/L (ref 21–32)
COLOR UR: ABNORMAL
CREAT SERPL-MCNC: 1.34 MG/DL (ref 0.6–1.3)
EOSINOPHIL # BLD AUTO: 0.26 THOUSAND/ΜL (ref 0–0.61)
EOSINOPHIL NFR BLD AUTO: 1 % (ref 0–6)
ERYTHROCYTE [DISTWIDTH] IN BLOOD BY AUTOMATED COUNT: 13.3 % (ref 11.6–15.1)
EXT PREG TEST URINE: NEGATIVE
EXT. CONTROL ED NAV: NORMAL
GFR SERPL CREATININE-BSD FRML MDRD: 47 ML/MIN/1.73SQ M
GLUCOSE SERPL-MCNC: 156 MG/DL (ref 65–140)
GLUCOSE UR STRIP-MCNC: NEGATIVE MG/DL
HCG SERPL QL: NEGATIVE
HCT VFR BLD AUTO: 40.4 % (ref 34.8–46.1)
HGB BLD-MCNC: 12.7 G/DL (ref 11.5–15.4)
HGB UR QL STRIP.AUTO: ABNORMAL
IMM GRANULOCYTES # BLD AUTO: 0.09 THOUSAND/UL (ref 0–0.2)
IMM GRANULOCYTES NFR BLD AUTO: 1 % (ref 0–2)
KETONES UR STRIP-MCNC: NEGATIVE MG/DL
LACTATE SERPL-SCNC: 1.4 MMOL/L (ref 0.5–2)
LACTATE SERPL-SCNC: 2.1 MMOL/L (ref 0.5–2)
LEUKOCYTE ESTERASE UR QL STRIP: NEGATIVE
LIPASE SERPL-CCNC: 80 U/L (ref 73–393)
LYMPHOCYTES # BLD AUTO: 1.39 THOUSANDS/ΜL (ref 0.6–4.47)
LYMPHOCYTES NFR BLD AUTO: 7 % (ref 14–44)
MCH RBC QN AUTO: 26.3 PG (ref 26.8–34.3)
MCHC RBC AUTO-ENTMCNC: 31.4 G/DL (ref 31.4–37.4)
MCV RBC AUTO: 84 FL (ref 82–98)
MONOCYTES # BLD AUTO: 1.42 THOUSAND/ΜL (ref 0.17–1.22)
MONOCYTES NFR BLD AUTO: 8 % (ref 4–12)
NEUTROPHILS # BLD AUTO: 15.6 THOUSANDS/ΜL (ref 1.85–7.62)
NEUTS SEG NFR BLD AUTO: 82 % (ref 43–75)
NITRITE UR QL STRIP: NEGATIVE
NON-SQ EPI CELLS URNS QL MICRO: ABNORMAL /HPF
NRBC BLD AUTO-RTO: 0 /100 WBCS
PH UR STRIP.AUTO: 6 [PH]
PLATELET # BLD AUTO: 314 THOUSANDS/UL (ref 149–390)
PMV BLD AUTO: 10.7 FL (ref 8.9–12.7)
POTASSIUM SERPL-SCNC: 3.9 MMOL/L (ref 3.5–5.3)
PROT SERPL-MCNC: 7.6 G/DL (ref 6.4–8.2)
PROT UR STRIP-MCNC: ABNORMAL MG/DL
RBC # BLD AUTO: 4.82 MILLION/UL (ref 3.81–5.12)
RBC #/AREA URNS AUTO: ABNORMAL /HPF
SODIUM SERPL-SCNC: 138 MMOL/L (ref 136–145)
SP GR UR STRIP.AUTO: 1.01 (ref 1–1.03)
UROBILINOGEN UR QL STRIP.AUTO: 0.2 E.U./DL
WBC # BLD AUTO: 18.85 THOUSAND/UL (ref 4.31–10.16)
WBC #/AREA URNS AUTO: ABNORMAL /HPF

## 2020-10-17 PROCEDURE — 99283 EMERGENCY DEPT VISIT LOW MDM: CPT | Performed by: EMERGENCY MEDICINE

## 2020-10-17 PROCEDURE — 84703 CHORIONIC GONADOTROPIN ASSAY: CPT | Performed by: EMERGENCY MEDICINE

## 2020-10-17 PROCEDURE — 36415 COLL VENOUS BLD VENIPUNCTURE: CPT | Performed by: EMERGENCY MEDICINE

## 2020-10-17 PROCEDURE — 81025 URINE PREGNANCY TEST: CPT | Performed by: EMERGENCY MEDICINE

## 2020-10-17 PROCEDURE — 96365 THER/PROPH/DIAG IV INF INIT: CPT

## 2020-10-17 PROCEDURE — 96361 HYDRATE IV INFUSION ADD-ON: CPT

## 2020-10-17 PROCEDURE — 85025 COMPLETE CBC W/AUTO DIFF WBC: CPT | Performed by: EMERGENCY MEDICINE

## 2020-10-17 PROCEDURE — 81001 URINALYSIS AUTO W/SCOPE: CPT | Performed by: EMERGENCY MEDICINE

## 2020-10-17 PROCEDURE — 83690 ASSAY OF LIPASE: CPT | Performed by: EMERGENCY MEDICINE

## 2020-10-17 PROCEDURE — 74177 CT ABD & PELVIS W/CONTRAST: CPT

## 2020-10-17 PROCEDURE — 99284 EMERGENCY DEPT VISIT MOD MDM: CPT

## 2020-10-17 PROCEDURE — G1004 CDSM NDSC: HCPCS

## 2020-10-17 PROCEDURE — 80053 COMPREHEN METABOLIC PANEL: CPT | Performed by: EMERGENCY MEDICINE

## 2020-10-17 PROCEDURE — 96375 TX/PRO/DX INJ NEW DRUG ADDON: CPT

## 2020-10-17 PROCEDURE — 76856 US EXAM PELVIC COMPLETE: CPT

## 2020-10-17 PROCEDURE — 83605 ASSAY OF LACTIC ACID: CPT | Performed by: EMERGENCY MEDICINE

## 2020-10-17 RX ORDER — ONDANSETRON 4 MG/1
4 TABLET, ORALLY DISINTEGRATING ORAL EVERY 6 HOURS PRN
Qty: 20 TABLET | Refills: 0 | Status: SHIPPED | OUTPATIENT
Start: 2020-10-17

## 2020-10-17 RX ORDER — NAPROXEN 500 MG/1
500 TABLET ORAL 2 TIMES DAILY WITH MEALS
Qty: 20 TABLET | Refills: 0 | Status: SHIPPED | OUTPATIENT
Start: 2020-10-17

## 2020-10-17 RX ORDER — ONDANSETRON 2 MG/ML
4 INJECTION INTRAMUSCULAR; INTRAVENOUS ONCE
Status: COMPLETED | OUTPATIENT
Start: 2020-10-17 | End: 2020-10-17

## 2020-10-17 RX ORDER — MORPHINE SULFATE 4 MG/ML
4 INJECTION, SOLUTION INTRAMUSCULAR; INTRAVENOUS ONCE
Status: DISCONTINUED | OUTPATIENT
Start: 2020-10-17 | End: 2020-10-18 | Stop reason: HOSPADM

## 2020-10-17 RX ORDER — KETOROLAC TROMETHAMINE 30 MG/ML
15 INJECTION, SOLUTION INTRAMUSCULAR; INTRAVENOUS ONCE
Status: COMPLETED | OUTPATIENT
Start: 2020-10-17 | End: 2020-10-17

## 2020-10-17 RX ADMIN — CEFTRIAXONE SODIUM 1000 MG: 10 INJECTION, POWDER, FOR SOLUTION INTRAVENOUS at 21:16

## 2020-10-17 RX ADMIN — KETOROLAC TROMETHAMINE 15 MG: 30 INJECTION, SOLUTION INTRAMUSCULAR at 19:43

## 2020-10-17 RX ADMIN — ONDANSETRON 4 MG: 2 INJECTION INTRAMUSCULAR; INTRAVENOUS at 19:41

## 2020-10-17 RX ADMIN — SODIUM CHLORIDE 1000 ML: 0.9 INJECTION, SOLUTION INTRAVENOUS at 19:38

## 2020-10-17 RX ADMIN — IOHEXOL 100 ML: 350 INJECTION, SOLUTION INTRAVENOUS at 20:27

## 2021-08-08 ENCOUNTER — HOSPITAL ENCOUNTER (EMERGENCY)
Facility: HOSPITAL | Age: 51
Discharge: HOME | End: 2021-08-08
Attending: EMERGENCY MEDICINE

## 2021-08-08 ENCOUNTER — APPOINTMENT (EMERGENCY)
Dept: CT IMAGING | Facility: HOSPITAL | Age: 51
End: 2021-08-08

## 2021-08-08 VITALS
RESPIRATION RATE: 18 BRPM | TEMPERATURE: 98 F | SYSTOLIC BLOOD PRESSURE: 132 MMHG | HEART RATE: 83 BPM | DIASTOLIC BLOOD PRESSURE: 70 MMHG | OXYGEN SATURATION: 100 %

## 2021-08-08 DIAGNOSIS — J02.0 STREP PHARYNGITIS: Primary | ICD-10-CM

## 2021-08-08 DIAGNOSIS — J36 PERITONSILLAR ABSCESS: ICD-10-CM

## 2021-08-08 LAB
ANION GAP SERPL CALCULATED.3IONS-SCNC: 13 MMOL/L (ref 4–13)
BASOPHILS # BLD AUTO: 0.1 THOUSANDS/ΜL (ref 0–0.1)
BASOPHILS NFR BLD AUTO: 1 % (ref 0–1)
BUN SERPL-MCNC: 8 MG/DL (ref 5–25)
CALCIUM SERPL-MCNC: 8.4 MG/DL (ref 8.3–10.1)
CHLORIDE SERPL-SCNC: 98 MMOL/L (ref 100–108)
CO2 SERPL-SCNC: 25 MMOL/L (ref 21–32)
CREAT SERPL-MCNC: 0.73 MG/DL (ref 0.6–1.3)
EOSINOPHIL # BLD AUTO: 0.04 THOUSAND/ΜL (ref 0–0.61)
EOSINOPHIL NFR BLD AUTO: 0 % (ref 0–6)
ERYTHROCYTE [DISTWIDTH] IN BLOOD BY AUTOMATED COUNT: 13.1 % (ref 11.6–15.1)
GFR SERPL CREATININE-BSD FRML MDRD: 97 ML/MIN/1.73SQ M
GLUCOSE SERPL-MCNC: 141 MG/DL (ref 65–140)
HCG SERPL QL: NEGATIVE
HCT VFR BLD AUTO: 42.2 % (ref 34.8–46.1)
HGB BLD-MCNC: 13.8 G/DL (ref 11.5–15.4)
IMM GRANULOCYTES # BLD AUTO: 0.11 THOUSAND/UL (ref 0–0.2)
IMM GRANULOCYTES NFR BLD AUTO: 1 % (ref 0–2)
LYMPHOCYTES # BLD AUTO: 1.38 THOUSANDS/ΜL (ref 0.6–4.47)
LYMPHOCYTES NFR BLD AUTO: 7 % (ref 14–44)
MCH RBC QN AUTO: 27 PG (ref 26.8–34.3)
MCHC RBC AUTO-ENTMCNC: 32.7 G/DL (ref 31.4–37.4)
MCV RBC AUTO: 82 FL (ref 82–98)
MONOCYTES # BLD AUTO: 1.32 THOUSAND/ΜL (ref 0.17–1.22)
MONOCYTES NFR BLD AUTO: 6 % (ref 4–12)
NEUTROPHILS # BLD AUTO: 17.68 THOUSANDS/ΜL (ref 1.85–7.62)
NEUTS SEG NFR BLD AUTO: 85 % (ref 43–75)
NRBC BLD AUTO-RTO: 0 /100 WBCS
PLATELET # BLD AUTO: 251 THOUSANDS/UL (ref 149–390)
PMV BLD AUTO: 11 FL (ref 8.9–12.7)
POTASSIUM SERPL-SCNC: 3.7 MMOL/L (ref 3.5–5.3)
RBC # BLD AUTO: 5.12 MILLION/UL (ref 3.81–5.12)
S PYO DNA THROAT QL NAA+PROBE: DETECTED
SODIUM SERPL-SCNC: 136 MMOL/L (ref 136–145)
WBC # BLD AUTO: 20.63 THOUSAND/UL (ref 4.31–10.16)

## 2021-08-08 PROCEDURE — 36415 COLL VENOUS BLD VENIPUNCTURE: CPT | Performed by: EMERGENCY MEDICINE

## 2021-08-08 PROCEDURE — 87651 STREP A DNA AMP PROBE: CPT | Performed by: EMERGENCY MEDICINE

## 2021-08-08 PROCEDURE — 96365 THER/PROPH/DIAG IV INF INIT: CPT

## 2021-08-08 PROCEDURE — 96375 TX/PRO/DX INJ NEW DRUG ADDON: CPT

## 2021-08-08 PROCEDURE — 85025 COMPLETE CBC W/AUTO DIFF WBC: CPT | Performed by: EMERGENCY MEDICINE

## 2021-08-08 PROCEDURE — 70491 CT SOFT TISSUE NECK W/DYE: CPT

## 2021-08-08 PROCEDURE — 99285 EMERGENCY DEPT VISIT HI MDM: CPT | Performed by: EMERGENCY MEDICINE

## 2021-08-08 PROCEDURE — 99284 EMERGENCY DEPT VISIT MOD MDM: CPT

## 2021-08-08 PROCEDURE — 80048 BASIC METABOLIC PNL TOTAL CA: CPT | Performed by: EMERGENCY MEDICINE

## 2021-08-08 PROCEDURE — 84703 CHORIONIC GONADOTROPIN ASSAY: CPT | Performed by: EMERGENCY MEDICINE

## 2021-08-08 RX ORDER — KETOROLAC TROMETHAMINE 30 MG/ML
15 INJECTION, SOLUTION INTRAMUSCULAR; INTRAVENOUS ONCE
Status: COMPLETED | OUTPATIENT
Start: 2021-08-08 | End: 2021-08-08

## 2021-08-08 RX ORDER — DEXAMETHASONE SODIUM PHOSPHATE 10 MG/ML
10 INJECTION, SOLUTION INTRAMUSCULAR; INTRAVENOUS ONCE
Status: COMPLETED | OUTPATIENT
Start: 2021-08-08 | End: 2021-08-08

## 2021-08-08 RX ORDER — CLINDAMYCIN PHOSPHATE 600 MG/50ML
600 INJECTION INTRAVENOUS ONCE
Status: COMPLETED | OUTPATIENT
Start: 2021-08-08 | End: 2021-08-08

## 2021-08-08 RX ORDER — CLINDAMYCIN HYDROCHLORIDE 300 MG/1
300 CAPSULE ORAL 3 TIMES DAILY
Qty: 30 CAPSULE | Refills: 0 | Status: SHIPPED | OUTPATIENT
Start: 2021-08-08 | End: 2021-08-18

## 2021-08-08 RX ORDER — ACETAMINOPHEN 325 MG/1
975 TABLET ORAL ONCE
Status: COMPLETED | OUTPATIENT
Start: 2021-08-08 | End: 2021-08-08

## 2021-08-08 RX ORDER — AMOXICILLIN 250 MG/1
1000 CAPSULE ORAL ONCE
Status: COMPLETED | OUTPATIENT
Start: 2021-08-08 | End: 2021-08-08

## 2021-08-08 RX ADMIN — AMOXICILLIN 1000 MG: 250 CAPSULE ORAL at 16:32

## 2021-08-08 RX ADMIN — KETOROLAC TROMETHAMINE 15 MG: 30 INJECTION, SOLUTION INTRAMUSCULAR at 18:43

## 2021-08-08 RX ADMIN — ACETAMINOPHEN 975 MG: 325 TABLET, FILM COATED ORAL at 14:55

## 2021-08-08 RX ADMIN — DEXAMETHASONE SODIUM PHOSPHATE 10 MG: 10 INJECTION, SOLUTION INTRAMUSCULAR; INTRAVENOUS at 15:01

## 2021-08-08 RX ADMIN — IOHEXOL 100 ML: 350 INJECTION, SOLUTION INTRAVENOUS at 16:22

## 2021-08-08 RX ADMIN — CLINDAMYCIN PHOSPHATE 600 MG: 600 INJECTION, SOLUTION INTRAVENOUS at 18:25

## 2021-08-08 NOTE — ED PROVIDER NOTES
Pt Name: Giovanni Prescott  MRN: [id number]  [place] 1970  Age/Sex: 53 y o  female  Date of evaluation: 8/8/2021  PCP: No primary care provider on file  CHIEF COMPLAINT    Chief Complaint   Patient presents with    Sore Throat     Patient reports feels like she has strep, sore throat and swollen lymph nodes  Patient reports neck swelling and pain increasing overnight  HPI    53 y o  female presenting with sore throat  Patient states she started having symptoms around 4-5 days ago  Feels like it started with the left side of her neck getting swollen  , she has pain on the left side of her neck  Difficulty swallowing due to the pain  Symptoms are not improving  Also has fevers now  Mentions last night she had difficulty breathing because of the neck swelling  No sick contacts  No cough  Past Medical and Surgical History    Past Medical History:   Diagnosis Date    Diabetes mellitus ([facility] [address] )     Diverticulitis     Hypertension        History reviewed  No pertinent surgical history  History reviewed  No pertinent family history  Social History     Tobacco Use    Smoking status: Current Every Day Smoker     Packs/day: 1 00     Types: Cigarettes    Smokeless tobacco: Never Used   Substance Use Topics    Alcohol use: Not Currently    Drug use: Not Currently           Allergies    No Known Allergies    Home Medications    Prior to Admission medications    Medication Sig Start Date End Date Taking?  Authorizing Provider   CIPROFLOXACIN PO Take by mouth    Historical Provider, MD   metFORMIN (GLUCOPHAGE) 500 mg tablet Take 500 mg by mouth 2 (two) times a day with meals    Historical Provider, MD   METRONIDAZOLE PO Take by mouth    Historical Provider, MD   naproxen (NAPROSYN) 500 mg tablet Take 1 tablet (500 mg total) by mouth 2 (two) times a day with meals 10/17/20   Alexx Pitt MD   ondansetron (ZOFRAN-ODT) 4 mg disintegrating tablet Take 1 tablet (4 mg total) by mouth every 6 (six) hours as needed for nausea or vomiting 10/17/20   Ernestina Quinonez MD           Review of Systems    Review of Systems   Constitutional: Positive for fever  Negative for chills  HENT: Positive for sore throat and trouble swallowing  Negative for rhinorrhea  Eyes: Negative for pain and visual disturbance  Respiratory: Positive for shortness of breath  Negative for cough  Cardiovascular: Negative for chest pain and leg swelling  Gastrointestinal: Negative for abdominal pain, nausea and vomiting  Genitourinary: Negative for dysuria and hematuria  Musculoskeletal: Positive for neck pain  Negative for back pain and myalgias  Skin: Negative for rash and wound  Neurological: Negative for syncope and headaches  Physical Exam      ED Triage Vitals [08/08/21 1231]   Temperature Pulse Respirations Blood Pressure SpO2   (!) 100 9 °F (38 3 °C) 95 18 137/74 100 %      Temp Source Heart Rate Source Patient Position - Orthostatic VS BP Location FiO2 (%)   Oral Monitor Sitting Left arm --      Pain Score       --               Physical Exam  Constitutional:       General: She is not in acute distress  Appearance: She is not ill-appearing  HENT:      Head: Normocephalic and atraumatic  Nose: Nose normal       Mouth/Throat:      Mouth: Mucous membranes are moist       Tonsils: Tonsillar exudate present  No tonsillar abscesses  2+ on the right  2+ on the left  Eyes:      Extraocular Movements: Extraocular movements intact  Pupils: Pupils are equal, round, and reactive to light  Neck:      Comments: Left anterior lower neck/submandibular tenderness to palpation  Cardiovascular:      Rate and Rhythm: Normal rate and regular rhythm  Pulmonary:      Effort: No respiratory distress  Breath sounds: Normal breath sounds  No stridor  No wheezing  Abdominal:      General: There is no distension  Tenderness: There is no abdominal tenderness     Musculoskeletal: General: No swelling or deformity  Normal range of motion  Cervical back: Normal range of motion and neck supple  Skin:     General: Skin is warm  Findings: No erythema  Neurological:      Mental Status: She is alert and oriented to person, place, and time  Mental status is at baseline                Diagnostic Results      Labs:    Results Reviewed     Procedure Component Value Units Date/Time    Strep A PCR [[id number]]  (Abnormal) Collected: 08/08/21 1502    Lab Status: Final result Specimen: Throat Updated: 08/08/21 1532     STREP A PCR Detected    Basic metabolic panel [[id number]]  (Abnormal) Collected: 08/08/21 1502    Lab Status: Final result Specimen: Blood from Arm, Left Updated: 08/08/21 1525     Sodium 136 mmol/L      Potassium 3 7 mmol/L      Chloride 98 mmol/L      CO2 25 mmol/L      ANION GAP 13 mmol/L      BUN 8 mg/dL      Creatinine 0 73 mg/dL      Glucose 141 mg/dL      Calcium 8 4 mg/dL      eGFR 97 ml/min/1 73sq m     Narrative:      [place] guidelines for Chronic Kidney Disease (CKD):     Stage 1 with normal or high GFR (GFR > 90 mL/min/1 73 square meters)    Stage 2 Mild CKD (GFR = 60-89 mL/min/1 73 square meters)    Stage 3A Moderate CKD (GFR = 45-59 mL/min/1 73 square meters)    Stage 3B Moderate CKD (GFR = 30-44 mL/min/1 73 square meters)    Stage 4 Severe CKD (GFR = 15-29 mL/min/1 73 square meters)    Stage 5 End Stage CKD (GFR <15 mL/min/1 73 square meters)  Note: GFR calculation is accurate only with a steady state creatinine    hCG, qualitative pregnancy [[id number]]  (Normal) Collected: 08/08/21 1502    Lab Status: Final result Specimen: Blood from Arm, Left Updated: 08/08/21 1525     Preg, Serum Negative    CBC and differential [[id number]]  (Abnormal) Collected: 08/08/21 1502    Lab Status: Final result Specimen: Blood from Arm, Left Updated: 08/08/21 1513     WBC 20 63 Thousand/uL      RBC 5 12 Million/uL      Hemoglobin 13 8 g/dL Hematocrit 42 2 %      MCV 82 fL      MCH 27 0 pg      MCHC 32 7 g/dL      RDW 13 1 %      MPV 11 0 fL      Platelets 090 Thousands/uL      nRBC 0 /100 WBCs      Neutrophils Relative 85 %      Immat GRANS % 1 %      Lymphocytes Relative 7 %      Monocytes Relative 6 %      Eosinophils Relative 0 %      Basophils Relative 1 %      Neutrophils Absolute 17 68 Thousands/µL      Immature Grans Absolute 0 11 Thousand/uL      Lymphocytes Absolute 1 38 Thousands/µL      Monocytes Absolute 1 32 Thousand/µL      Eosinophils Absolute 0 04 Thousand/µL      Basophils Absolute 0 10 Thousands/µL           All labs reviewed and utilized in the medical decision making process    Radiology:    CT soft tissue neck   Final Result      Mild asymmetric inflammation of the tonsils with 1 0 x 0 5 cm left peritonsillar collection  Single mildly prominent level 2 node with mild surrounding inflammatory changes that is likely reactive  Opacification of the left pyriform sinus may represent secretions but suggest ENT follow-up for direct visualization  The study was marked in [facility] for immediate notification  Workstation performed: [id number]             All radiology studies independently viewed by me and interpreted by the radiologist     Procedure    Procedures        MDM    Patient presenting with pharyngitis, febrile in the emergency department  No uvular deviation, likely not a peritonsillar abscess however she has neck swelling, difficulty swallowing and had difficulty breathing last night, will obtain CT soft tissue neck as well as blood work  Will give Decadron  ED Course as of Aug 08 2111   Adela Pitch Aug 08, 2021   1810 Discussed results with ENT, Dr Atul Quintero, recommending IV clindamycin and Decadron  Patient already given Decadron  For ENT patient does not need to stay in the hospital   Will give dose of IV clindamycin  Will discharge on clindamycin as well  Advised close follow-up with PCP    Provided prescription for clindamycin  Return precautions discussed  Advised hydration  Patient verbalized understanding and is in agreement with plan  Medications   acetaminophen (TYLENOL) tablet 975 mg (975 mg Oral Given 8/8/21 1455)   dexamethasone (PF) (DECADRON) injection 10 mg (10 mg Intravenous Given 8/8/21 1501)   amoxicillin (AMOXIL) capsule 1,000 mg (1,000 mg Oral Given 8/8/21 1632)   iohexol (OMNIPAQUE) 350 MG/ML injection (SINGLE-DOSE) 100 mL (100 mL Intravenous Given 8/8/21 1622)   clindamycin (CLEOCIN) IVPB (premix in dextrose) 600 mg 50 mL (0 mg Intravenous Stopped 8/8/21 1919)   ketorolac (TORADOL) injection 15 mg (15 mg Intravenous Given 8/8/21 1843)           FINAL IMPRESSION    Final diagnoses:   Strep pharyngitis   Peritonsillar abscess         DISPOSITION    Time reflects when diagnosis was documented in both MDM as applicable and the Disposition within this note     Time User Action Codes Description Comment    8/8/2021  6:34 PM Albert Chadwick Add [J02 0] Strep pharyngitis     8/8/2021  6:35 PM Álvaro, [address] [facility] Peritonsillar abscess       ED Disposition     ED Disposition Condition Date/Time Comment    Discharge Stable [place] Aug 8, 2021  6:34 PM Zo Pinedo discharge to home/self care  Follow-up Information    None           PATIENT REFERRED TO:    No follow-up provider specified      DISCHARGE MEDICATIONS:    Discharge Medication List as of 8/8/2021  7:08 PM      START taking these medications    Details   clindamycin (CLEOCIN) 300 MG capsule Take 1 capsule (300 mg total) by mouth 3 (three) times a day for 10 days, Starting Sun 8/8/2021, Until Wed 8/18/2021, Print         CONTINUE these medications which have NOT CHANGED    Details   CIPROFLOXACIN PO Take by mouth, Historical Med      metFORMIN (GLUCOPHAGE) 500 mg tablet Take 500 mg by mouth 2 (two) times a day with meals, Historical Med      METRONIDAZOLE PO Take by mouth, Historical Med      naproxen (NAPROSYN) 500 mg tablet Take 1 tablet (500 mg total) by mouth 2 (two) times a day with meals, Starting Sat 10/17/2020, Print      ondansetron (ZOFRAN-ODT) 4 mg disintegrating tablet Take 1 tablet (4 mg total) by mouth every 6 (six) hours as needed for nausea or vomiting, Starting Sat 10/17/2020, Print             No discharge procedures on file  Sin uDque DO        This note was partially completed using voice recognition technology, and was scanned for gross errors; however some errors may still exist  Please contact the author with any questions or requests for clarification        Sin Duque DO  08/08/21 0746

## 2021-08-08 NOTE — DISCHARGE INSTRUCTIONS
Please take the antibiotics as prescribed  Follow-up with your family doctor as scheduled on Tuesday  Drink plenty of fluids stay hydrated  Return to the emergency department for any new or worsening symptoms

## 2021-08-26 VITALS
RESPIRATION RATE: 18 BRPM | TEMPERATURE: 97.3 F | OXYGEN SATURATION: 96 % | DIASTOLIC BLOOD PRESSURE: 91 MMHG | HEART RATE: 89 BPM | SYSTOLIC BLOOD PRESSURE: 213 MMHG

## 2021-08-26 PROCEDURE — 99284 EMERGENCY DEPT VISIT MOD MDM: CPT

## 2021-08-27 ENCOUNTER — APPOINTMENT (EMERGENCY)
Dept: RADIOLOGY | Facility: HOSPITAL | Age: 51
End: 2021-08-27

## 2021-08-27 ENCOUNTER — HOSPITAL ENCOUNTER (EMERGENCY)
Facility: HOSPITAL | Age: 51
Discharge: HOME | End: 2021-08-27
Attending: EMERGENCY MEDICINE

## 2021-08-27 DIAGNOSIS — R20.2 PARESTHESIAS: Primary | ICD-10-CM

## 2021-08-27 LAB
ALBUMIN SERPL BCP-MCNC: 3.5 G/DL (ref 3.5–5)
ALP SERPL-CCNC: 60 U/L (ref 46–116)
ALT SERPL W P-5'-P-CCNC: 28 U/L (ref 12–78)
ANION GAP SERPL CALCULATED.3IONS-SCNC: 4 MMOL/L (ref 4–13)
AST SERPL W P-5'-P-CCNC: 14 U/L (ref 5–45)
ATRIAL RATE: 83 BPM
BASOPHILS # BLD AUTO: 0.06 THOUSANDS/ΜL (ref 0–0.1)
BASOPHILS NFR BLD AUTO: 1 % (ref 0–1)
BILIRUB SERPL-MCNC: 0.22 MG/DL (ref 0.2–1)
BUN SERPL-MCNC: 16 MG/DL (ref 5–25)
CALCIUM SERPL-MCNC: 8.6 MG/DL (ref 8.3–10.1)
CHLORIDE SERPL-SCNC: 106 MMOL/L (ref 100–108)
CO2 SERPL-SCNC: 30 MMOL/L (ref 21–32)
CREAT SERPL-MCNC: 0.62 MG/DL (ref 0.6–1.3)
EOSINOPHIL # BLD AUTO: 0.37 THOUSAND/ΜL (ref 0–0.61)
EOSINOPHIL NFR BLD AUTO: 4 % (ref 0–6)
ERYTHROCYTE [DISTWIDTH] IN BLOOD BY AUTOMATED COUNT: 13.5 % (ref 11.6–15.1)
GFR SERPL CREATININE-BSD FRML MDRD: 106 ML/MIN/1.73SQ M
GLUCOSE SERPL-MCNC: 112 MG/DL (ref 65–140)
HCT VFR BLD AUTO: 38.6 % (ref 34.8–46.1)
HGB BLD-MCNC: 12.6 G/DL (ref 11.5–15.4)
LYMPHOCYTES # BLD AUTO: 3.41 THOUSANDS/ΜL (ref 0.6–4.47)
LYMPHOCYTES NFR BLD AUTO: 33 % (ref 14–44)
MCH RBC QN AUTO: 27.4 PG (ref 26.8–34.3)
MCHC RBC AUTO-ENTMCNC: 32.6 G/DL (ref 31.4–37.4)
MCV RBC AUTO: 84 FL (ref 82–98)
MONOCYTES # BLD AUTO: 0.65 THOUSAND/ΜL (ref 0.17–1.22)
MONOCYTES NFR BLD AUTO: 6 % (ref 4–12)
NEUTROPHILS # BLD AUTO: 5.88 THOUSANDS/ΜL (ref 1.85–7.62)
NEUTS SEG NFR BLD AUTO: 56 % (ref 43–75)
P AXIS: 34 DEGREES
PLATELET # BLD AUTO: 260 THOUSANDS/UL (ref 149–390)
PMV BLD AUTO: 10.6 FL (ref 8.9–12.7)
POTASSIUM SERPL-SCNC: 3.6 MMOL/L (ref 3.5–5.3)
PR INTERVAL: 130 MS
PROT SERPL-MCNC: 7.1 G/DL (ref 6.4–8.2)
QRS AXIS: 44 DEGREES
QRSD INTERVAL: 82 MS
QT INTERVAL: 398 MS
QTC INTERVAL: 467 MS
RBC # BLD AUTO: 4.6 MILLION/UL (ref 3.81–5.12)
SODIUM SERPL-SCNC: 140 MMOL/L (ref 136–145)
T WAVE AXIS: 44 DEGREES
TROPONIN I SERPL-MCNC: <0.02 NG/ML
VENTRICULAR RATE: 83 BPM
WBC # BLD AUTO: 10.37 THOUSAND/UL (ref 4.31–10.16)

## 2021-08-27 PROCEDURE — 99284 EMERGENCY DEPT VISIT MOD MDM: CPT | Performed by: EMERGENCY MEDICINE

## 2021-08-27 PROCEDURE — 93005 ELECTROCARDIOGRAM TRACING: CPT

## 2021-08-27 PROCEDURE — 93010 ELECTROCARDIOGRAM REPORT: CPT | Performed by: INTERNAL MEDICINE

## 2021-08-27 PROCEDURE — 84484 ASSAY OF TROPONIN QUANT: CPT | Performed by: EMERGENCY MEDICINE

## 2021-08-27 PROCEDURE — 85025 COMPLETE CBC W/AUTO DIFF WBC: CPT | Performed by: EMERGENCY MEDICINE

## 2021-08-27 PROCEDURE — 71046 X-RAY EXAM CHEST 2 VIEWS: CPT

## 2021-08-27 PROCEDURE — 36415 COLL VENOUS BLD VENIPUNCTURE: CPT | Performed by: EMERGENCY MEDICINE

## 2021-08-27 PROCEDURE — 80053 COMPREHEN METABOLIC PANEL: CPT | Performed by: EMERGENCY MEDICINE

## 2021-08-27 NOTE — ED PROVIDER NOTES
History  Chief Complaint   Patient presents with    Tingling     Pt reports B/L hand and feet tingling and burning  Pt reports this began 6 days ago while on vacation, reports reports feet are resolved and left hand is getting better  Patient is a 71-year-old female past medical history diabetes, hypertension, diverticulitis presenting with tingling  Patient states that for the last 5-6 days she has had bilateral hand and foot tingling and burning which is worse with pressure to the area  She states that she was having this sensation to the dorsum of her feet bilaterally however that has fully resolved and states that it was to the 1st through 4th digits in the bilateral hand but states that the left hand is now improved, now only to the 1st 3 digits however right hand is unchanged  She also notes intermittent burning pain throughout the right arm which started yesterday and states that nothing makes it better or worse, not relation to movement  She states that the tingling is electric feeling  Has never had this before has not taken any medication for it  Recently saw her PCP and has hemoglobin A1c of 5 1, had metformin reduced, told that she has normal cholesterol and does not take any medication for hypertension, never has  She does note severe anxiety surrounding her symptoms currently  Denies any neck pain, chest pain, shortness of breath, vision changes, headache, vomiting/diarrhea/constipation but does no current nausea  Denies any fevers, cough, upper respiratory symptoms, dysuria, rashes, vision changes  Was told by PCP to follow-up with neurology but does not have appointment till October  Prior to Admission Medications   Prescriptions Last Dose Informant Patient Reported? Taking?    CIPROFLOXACIN PO   Yes No   Sig: Take by mouth   METRONIDAZOLE PO   Yes No   Sig: Take by mouth   metFORMIN (GLUCOPHAGE) 500 mg tablet   Yes No   Sig: Take 500 mg by mouth 2 (two) times a day with meals naproxen (NAPROSYN) 500 mg tablet   No No   Sig: Take 1 tablet (500 mg total) by mouth 2 (two) times a day with meals   ondansetron (ZOFRAN-ODT) 4 mg disintegrating tablet   No No   Sig: Take 1 tablet (4 mg total) by mouth every 6 (six) hours as needed for nausea or vomiting      Facility-Administered Medications: None       Past Medical History:   Diagnosis Date    Diabetes mellitus ([facility] [address] )     Diverticulitis     Hypertension        History reviewed  No pertinent surgical history  History reviewed  No pertinent family history  I have reviewed and agree with the history as documented  E-Cigarette/Vaping     E-Cigarette/Vaping Substances     Social History     Tobacco Use    Smoking status: Current Every Day Smoker     Packs/day: 1 00     Types: Cigarettes    Smokeless tobacco: Never Used   Substance Use Topics    Alcohol use: Not Currently    Drug use: Not Currently       Review of Systems   All other systems reviewed and are negative  Physical Exam  Physical Exam  Vitals reviewed  Constitutional:       General: She is not in acute distress  Appearance: Normal appearance  She is not ill-appearing  HENT:      Mouth/Throat:      Mouth: Mucous membranes are moist    Eyes:      Conjunctiva/sclera: Conjunctivae normal    Cardiovascular:      Rate and Rhythm: Normal rate and regular rhythm  Pulses: Normal pulses  Heart sounds: Normal heart sounds  Pulmonary:      Effort: Pulmonary effort is normal       Breath sounds: Normal breath sounds  Abdominal:      General: Abdomen is flat  Palpations: Abdomen is soft  Tenderness: There is no abdominal tenderness  Musculoskeletal:         General: No swelling or tenderness  Normal range of motion  Cervical back: Normal range of motion and neck supple  No tenderness  Right lower leg: No edema  Left lower leg: No edema  Skin:     General: Skin is warm and dry        Capillary Refill: Capillary refill takes less than 2 seconds  Neurological:      General: No focal deficit present  Mental Status: She is alert  Sensory: No sensory deficit  Motor: No weakness     Psychiatric:         Mood and Affect: Mood normal          Vital Signs  ED Triage Vitals [08/26/21 2344]   Temperature Pulse Respirations Blood Pressure SpO2   (!) 97 3 °F (36 3 °C) 89 18 (!) 213/91 96 %      Temp Source Heart Rate Source Patient Position - Orthostatic VS BP Location FiO2 (%)   Temporal Monitor Sitting Right arm --      Pain Score       --           Vitals:    08/26/21 2344   BP: (!) 213/91   Pulse: 89   Patient Position - Orthostatic VS: Sitting         Visual Acuity      ED Medications  Medications - No data to display    Diagnostic Studies  Results Reviewed     Procedure Component Value Units Date/Time    Comprehensive metabolic panel [[id number]] Collected: 08/27/21 0040    Lab Status: Final result Specimen: Blood from Arm, Left Updated: 08/27/21 0104     Sodium 140 mmol/L      Potassium 3 6 mmol/L      Chloride 106 mmol/L      CO2 30 mmol/L      ANION GAP 4 mmol/L      BUN 16 mg/dL      Creatinine 0 62 mg/dL      Glucose 112 mg/dL      Calcium 8 6 mg/dL      AST 14 U/L      ALT 28 U/L      Alkaline Phosphatase 60 U/L      Total Protein 7 1 g/dL      Albumin 3 5 g/dL      Total Bilirubin 0 22 mg/dL      eGFR 106 ml/min/1 73sq m     Narrative:      [place] guidelines for Chronic Kidney Disease (CKD):     Stage 1 with normal or high GFR (GFR > 90 mL/min/1 73 square meters)    Stage 2 Mild CKD (GFR = 60-89 mL/min/1 73 square meters)    Stage 3A Moderate CKD (GFR = 45-59 mL/min/1 73 square meters)    Stage 3B Moderate CKD (GFR = 30-44 mL/min/1 73 square meters)    Stage 4 Severe CKD (GFR = 15-29 mL/min/1 73 square meters)    Stage 5 End Stage CKD (GFR <15 mL/min/1 73 square meters)  Note: GFR calculation is accurate only with a steady state creatinine    Troponin I [[id number]]  (Normal) Collected: 08/27/21 0040    Lab Status: Final result Specimen: Blood from Arm, Left Updated: 08/27/21 0103     Troponin I <0 02 ng/mL     CBC and differential [[id number]]  (Abnormal) Collected: 08/27/21 0040    Lab Status: Final result Specimen: Blood from Arm, Left Updated: 08/27/21 0048     WBC 10 37 Thousand/uL      RBC 4 60 Million/uL      Hemoglobin 12 6 g/dL      Hematocrit 38 6 %      MCV 84 fL      MCH 27 4 pg      MCHC 32 6 g/dL      RDW 13 5 %      MPV 10 6 fL      Platelets 860 Thousands/uL      Neutrophils Relative 56 %      Lymphocytes Relative 33 %      Monocytes Relative 6 %      Eosinophils Relative 4 %      Basophils Relative 1 %      Neutrophils Absolute 5 88 Thousands/µL      Lymphocytes Absolute 3 41 Thousands/µL      Monocytes Absolute 0 65 Thousand/µL      Eosinophils Absolute 0 37 Thousand/µL      Basophils Absolute 0 06 Thousands/µL                  XR chest 2 views   ED Interpretation by Doyce Kussmaul, DO (08/27 0036)   NAD                 Procedures  ECG 12 Lead Documentation Only    Date/Time: 8/27/2021 1:11 AM  Performed by: Doyce Kussmaul, DO  Authorized by: Doyce Kussmaul, DO     ECG reviewed by me, the ED Provider: yes    Patient location:  ED  Previous ECG:     Previous ECG:  Unavailable  Interpretation:     Interpretation: normal    Rate:     ECG rate assessment: normal    Rhythm:     Rhythm: sinus rhythm    Ectopy:     Ectopy: PVCs      PVCs:  Infrequent  QRS:     QRS axis:  Normal    QRS intervals:  Normal  Conduction:     Conduction: normal    ST segments:     ST segments:  Normal  T waves:     T waves: normal               ED Course  ED Course as of Aug 27 0615   Fri Aug 27, 2021   0118 Workup unremarkable, repeat /70, have discussed return precautions of chest pain, shortness of breath, weakness or numbness to the arm, fevers, passing out patient states she understands  Have advised PCP and neurology follow-up                                                MDM  Number of Diagnoses or Management Options  Diagnosis management comments: Patient is a 71-year-old female past medical history diabetes, hypertension, diverticulitis presenting for tingling/burning pain  Patient is well-appearing bedside with stable vitals though hypertensive to 213/91 with no history of hypertension requiring medication  Possibly secondary to anxiety will continue to monitor as patient denies any current chest pain, shortness of breath, numbness, weakness, vomiting, headaches, vision changes and has no significant physical exam findings  She has intact  strength and sensation and pulses throughout the bilateral upper and lower extremities, full range of motion of her neck with no reproducibility of symptoms, no spinal tenderness and no other significant physical exam findings  Will obtain cardiac workup to rule out cardiac cause versus electrolyte abnormality especially in the setting of hypertension and if unremarkable will discharge with Neurology follow-up as I do not see any emergent cause of her symptoms which are likely relative to neuropathy  Have discussed this with the plan with the patient she states she understands  Will recheck blood pressure prior to discharge  Disposition  Final diagnoses:   Paresthesias     Time reflects when diagnosis was documented in both MDM as applicable and the Disposition within this note     Time User Action Codes Description Comment    8/27/2021  1:18 AM Nina Chacko Add [R20 2] Paresthesias       ED Disposition     ED Disposition Condition Date/Time Comment    Discharge Stable Fri Aug 27, 2021  1:18 AM Zo Pniedo discharge to home/self care              Follow-up Information     Follow up With Specialties Details Why Contact Info Additional [facility] Neurology Schedule an appointment as soon as possible for a visit   [address] [facility] [id number] [phone] [address] [facility] Neurology [address], [address], [place], [address] [facility], [address] [address]    Odell Colón DO Internal Medicine Schedule an appointment as soon as possible for a visit in 1 week  [address] [address]  [phone]             Discharge Medication List as of 8/27/2021  1:19 AM      CONTINUE these medications which have NOT CHANGED    Details   CIPROFLOXACIN PO Take by mouth, Historical Med      metFORMIN (GLUCOPHAGE) 500 mg tablet Take 500 mg by mouth 2 (two) times a day with meals, Historical Med      METRONIDAZOLE PO Take by mouth, Historical Med      naproxen (NAPROSYN) 500 mg tablet Take 1 tablet (500 mg total) by mouth 2 (two) times a day with meals, Starting Sat 10/17/2020, Print      ondansetron (ZOFRAN-ODT) 4 mg disintegrating tablet Take 1 tablet (4 mg total) by mouth every 6 (six) hours as needed for nausea or vomiting, Starting Sat 10/17/2020, Print           No discharge procedures on file      PDMP Review     None          ED Provider  Electronically Signed by           Rick Rey DO  08/27/21 9848

## 2023-02-16 NOTE — PDOC
History of Present Illness





- General


History Source: Patient


Exam Limitations: No Limitations





- History of Present Illness


Initial Comments: 


18 11:24


The patient 47-year-old female, with a significant past medical history of 

asthma and sciatica, who presents to the ED with 2 days of worsening right-

sided mid back pain. The patient states her pain began 2 days after a deep 

tissue massage. The patient initially noted some discomfort in the area which 

has progressively worsened since then. The patient rates the pain a 10/10 in 

severity, exacerbated with changes in position (arm lifting and torso twisting) 

and deep inspiration, and accompanied by shortness of breath (only when 

standing upright). She reports taking 800mg of Motrin before bed last night, 

with no relief of her symptoms.  





The patient denies any fever, chills, nausea, vomiting, diarrhea, or abdominal 

pain. 


Denies any urinary changes. 


Denies any chest pain. 





Allergies: NKDA, peanuts, shellfish derived


Surgical History: D&C for 2 missed abortions


Social History: No reported tobacco, alcohol, or drug use.


PCP: Dr. Omar Ludwig








<Eloisa Strange - Last Filed: 18 11:25>





<Jamey Hewitt - Last Filed: 18 12:40>





- General


Chief Complaint: Back Pain


Stated Complaint: RT SIDE PAIN/ SOB


Time Seen by Provider: 18 10:04





Past History





<Eloisa Strange - Last Filed: 18 11:25>





- Past Medical History


Anemia: No


Asthma: Yes


Cancer: No


Cardiac Disorders: No


CVA: No


COPD: No


CHF: No


Dementia: No


Diabetes: Yes (H/O , BS UNDER CONTROL WITH DIET/EXERCISE)


GI Disorders: No


 Disorders: No


HTN: No


Hypercholesterolemia: No


Liver Disease: Yes ("fatty liver")


Psychiatric Problems: Yes (ANXIETY)


Seizures: No


Thyroid Disease: No





- Surgical History


Abdominal Surgery: Yes (D&C FOR MISSED ABX2)


Appendectomy: No


Cardiac Surgery: No


Cholecystectomy: No


Lung Surgery: No


Neurologic Surgery: No


Orthopedic Surgery: No





- Reproductive History


 (#): 11


Para: 5


Therapeutic (s) & number: Yes (3)


Spontaneous : 3





- Immunization History


Immunization Up to Date: Yes





- Suicide/Smoking/Psychosocial Hx


Smoking Status: Yes


Smoking History: Current every day smoker


Have you smoked in the past 12 months: Yes


Number of Cigarettes Smoked Daily: 7


If you are a former smoker, when did you quit?: 1 DAY AGO


Information on smoking cessation initiated: No


'Breaking Loose' booklet given: 10/21/17


Hx Alcohol Use: No


Drug/Substance Use Hx: Yes (Marijuana)


Substance Use Type: Marijuana


Hx Substance Use Treatment: No





<Jamey Hewitt - Last Filed: 18 12:40>





- Past Medical History


Allergies/Adverse Reactions: 


 Allergies











Allergy/AdvReac Type Severity Reaction Status Date / Time


 


No Known Drug Allergies Allergy   Verified 18 09:47


 


peanut Allergy   Verified 18 09:47


 


shellfish derived Allergy   Verified 18 09:47











Home Medications: 


Ambulatory Orders





Albuterol 0.083% Nebulizer Sol [Ventolin 0.083% Nebulizer Soln -] 1 neb NEB Q4H 

PRN #1 vial 10/21/17 


Albuterol Sulfate Inhaler - [Ventolin HFA Inhaler -] 2 inh PO Q4H PRN #1 inh 10/

21/17 


Naproxen 500 mg PO BID PRN #20 tablet 18 


Tramadol HCl [Ultram] 50 mg PO TID PRN #10 tablet MDD 3 tabs 18 











**Review of Systems





- Review of Systems


Able to Perform ROS?: Yes





<Eloisa Strange - Last Filed: 18 11:25>





- Review of Systems


Constitutional: No: Chills, Fever


Respiratory: No: Cough, Shortness of Breath


Cardiac (ROS): No: Chest Pain


: No: Dysuria, Flank Pain, Hematuria


Musculoskeletal: Yes: Muscle Pain (R mid back pain)


Integumentary: No: Rash


All Other Systems: Reviewed and Negative





<Jamey Hewitt - Last Filed: 18 12:40>





*Physical Exam





- Vital Signs


 Last Vital Signs











Temp Pulse Resp BP Pulse Ox


 


 97.9 F   82   16   129/44   99 


 


 18 09:48  18 09:48  18 09:48  18 09:48  18 09:48














- Physical Exam


Comments: 


18 11:27


GENERAL: The patient is awake, alert, and fully oriented, in no acute distress.


HEAD: Normal with no signs of trauma.


EYES: Pupils equal, round and reactive to light, extraocular movements intact, 

sclera anicteric, conjunctiva clear with no pallor.


ENT: Ears normal, nares patent, oropharynx clear without exudates.  Moist 

mucous membranes.


NECK: Normal range of motion, supple without lymphadenopathy, JVD, or masses.


LUNGS: Breath sounds equal, clear to auscultation bilaterally.  No wheeze/

crackles.


HEART: Regular rate and rhythm, normal S1 and S2 without murmur or rub.


ABDOMEN: Soft/nontender/nondistended. BS wnl.  No guarding or rebound.  No 

palpable masses. No hepatosplenomegaly.


EXTREMITIES: Normal range of motion, no edema.  No clubbing or cyanosis. No 

cords, erythema, or tenderness.


MUSCULOSKELETAL: (+)Some reproducible discomfort to the right mid-back in the 

scapula line, around ribs 8 and 9. Reproducible discomfort, mostly with 

positional changes. No crepitus, no deformity, no CVA tenderness


NEUROLOGICAL: Cranial nerves II through XII grossly intact.  Normal speech.


PSYCH: Normal mood, normal affect.


SKIN: Warm, Dry, normal turgor, no rashes or lesions noted.





<Eloisa Strange - Last Filed: 18 11:25>





- Vital Signs


 Last Vital Signs











Temp Pulse Resp BP Pulse Ox


 


 97.9 F   82   16   129/44   99 


 


 18 09:48  18 09:48  18 09:48  18 09:48  18 09:48














<Jamey Hewitt - Last Filed: 18 12:40>





ED Treatment Course





- Medications


Given in the ED: 


ED Medications














Discontinued Medications














Generic Name Dose Route Start Last Admin





  Trade Name Freq  PRN Reason Stop Dose Admin


 


Ketorolac Tromethamine  60 mg  18 10:40  18 10:52





  Toradol Injection -  IM  18 10:41  60 mg





  ONCE ONE   Administration


 


Tramadol HCl  50 mg  18 10:40  18 10:52





  Ultram -  PO  18 10:41  50 mg





  ONCE ONE   Administration














<Eloisa Strange - Last Filed: 18 11:25>





- RADIOLOGY


Radiology Studies Ordered: 














 Category Date Time Status


 


 CHEST PA & LAT [RAD] Stat Radiology  18 10:39 Ordered














<Jamey Hewitt - Last Filed: 18 12:40>





Medical Decision Making





- Medical Decision Making





18 10:52


A portion of this note was documented by scribe services under my direction. I 

have reviewed the details of the note, within reason, and agree with the 

documentation with the following case summary and management plan written by me.





47-year-old female with no severe past medical history presents with worsening 

right mid back pain for 2 days. Symptoms began about one or 2 days after a deep 

tissue massage, initially noted some discomfort in the region which has 

progressively worsened. The pain is very positional, worse with deep 

inspiration and any torso twisting or arm lifting, denies any cough or fevers 

or chills. No GI or  symptoms, took ibuprofen and was able to sleep but 

presents for evaluation.





Vital signs normal.


Well-appearing, able to get comfortable in stretcher


Heart and lungs are regular and clear


Reproducible discomfort with positional changes and palpation in the right mid 

back, skin is clear without bruising or rib deformity


No edema or calf tenderness





47-year-old female with muscle skeletal right mid back pain, likely rib bruise 

or intercostal muscle strain, less likely fracture. Rule out pneumothorax, no 

GI or  red flags on history or physical exam.


Chest x-ray


Pain control


Reassess


18 12:35


Chest x-ray normal. Feels much better after medications, ambulating comfortably.


Agrees with discharge plan, pain meds for likely rib bruise/intercostal muscle 

strain, understands return criteria.





<Jamey Hewitt - Last Filed: 18 12:40>





*DC/Admit/Observation/Transfer





- Attestations


Scribe Attestion: 


18 11:36





Documentation prepared by Eloisa Strange, acting as medical scribe for Jamey Hewitt MD.





<Eloisa Strange - Last Filed: 18 11:25>





<Jamey Hewitt - Last Filed: 18 12:40>


Diagnosis at time of Disposition: 


 Mid back pain on right side








- Discharge Dispostion


Disposition: HOME


Condition at time of disposition: Improved





- Prescriptions


Prescriptions: 


Naproxen 500 mg PO BID PRN #20 tablet


 PRN Reason: Pain


Tramadol HCl [Ultram] 50 mg PO TID PRN #10 tablet MDD 3 tabs


 PRN Reason: Pain





- Referrals


Referrals: 


Omar Ludwig [Primary Care Provider] - 





- Patient Instructions


Printed Discharge Instructions:  DI for Rib Contusion


Additional Instructions: 


Activity as tolerated. Stay hydrated. 





A chest x-ray shows no acute abnormality. As discussed, your symptoms are 

likely due to a rib or muscle bruise, which should get better over the next 5-7 

days. Take naproxen as prescribed for moderate pain, take tramadol as 

prescribed as needed for severe pain. Tramadol can make you lightheaded, so 

take proper precautions.





Continue your medications as previously prescribed by your physician. 





You should follow up with your primary doctor as soon as possible regarding 

today's emergency department visit.





Return to the emergency department for any new or concerning symptoms, 

particularly persistent or worsening pain, redness or rash or swelling, 

shortness of breath, difficulty urinating or bloody urine. MD//ASHLEY/KEISHA

## 2023-05-21 ENCOUNTER — APPOINTMENT (EMERGENCY)
Dept: CT IMAGING | Facility: HOSPITAL | Age: 53
End: 2023-05-21

## 2023-05-21 ENCOUNTER — HOSPITAL ENCOUNTER (EMERGENCY)
Facility: HOSPITAL | Age: 53
Discharge: HOME | End: 2023-05-21
Attending: EMERGENCY MEDICINE

## 2023-05-21 VITALS
RESPIRATION RATE: 22 BRPM | TEMPERATURE: 98.4 F | OXYGEN SATURATION: 99 % | SYSTOLIC BLOOD PRESSURE: 180 MMHG | DIASTOLIC BLOOD PRESSURE: 79 MMHG | HEART RATE: 92 BPM

## 2023-05-21 DIAGNOSIS — R10.9 NONSPECIFIC ABDOMINAL PAIN: Primary | ICD-10-CM

## 2023-05-21 LAB
ALBUMIN SERPL BCP-MCNC: 4.2 G/DL (ref 3.5–5)
ALP SERPL-CCNC: 60 U/L (ref 34–104)
ALT SERPL W P-5'-P-CCNC: 20 U/L (ref 7–52)
ANION GAP SERPL CALCULATED.3IONS-SCNC: 6 MMOL/L (ref 4–13)
AST SERPL W P-5'-P-CCNC: 19 U/L (ref 13–39)
BACTERIA UR QL AUTO: ABNORMAL /HPF
BASOPHILS # BLD AUTO: 0.09 THOUSANDS/ÂΜL (ref 0–0.1)
BASOPHILS NFR BLD AUTO: 1 % (ref 0–1)
BILIRUB SERPL-MCNC: 0.54 MG/DL (ref 0.2–1)
BILIRUB UR QL STRIP: NEGATIVE
BUN SERPL-MCNC: 10 MG/DL (ref 5–25)
CALCIUM SERPL-MCNC: 9.1 MG/DL (ref 8.4–10.2)
CHLORIDE SERPL-SCNC: 106 MMOL/L (ref 96–108)
CLARITY UR: CLEAR
CO2 SERPL-SCNC: 25 MMOL/L (ref 21–32)
COLOR UR: YELLOW
CREAT SERPL-MCNC: 0.62 MG/DL (ref 0.6–1.3)
EOSINOPHIL # BLD AUTO: 0.45 THOUSAND/ÂΜL (ref 0–0.61)
EOSINOPHIL NFR BLD AUTO: 4 % (ref 0–6)
ERYTHROCYTE [DISTWIDTH] IN BLOOD BY AUTOMATED COUNT: 13.3 % (ref 11.6–15.1)
EXT PREGNANCY TEST URINE: NEGATIVE
EXT. CONTROL: NORMAL
GFR SERPL CREATININE-BSD FRML MDRD: 104 ML/MIN/1.73SQ M
GLUCOSE SERPL-MCNC: 165 MG/DL (ref 65–140)
GLUCOSE UR STRIP-MCNC: NEGATIVE MG/DL
HCT VFR BLD AUTO: 38.2 % (ref 34.8–46.1)
HGB BLD-MCNC: 12.6 G/DL (ref 11.5–15.4)
HGB UR QL STRIP.AUTO: ABNORMAL
IMM GRANULOCYTES # BLD AUTO: 0.02 THOUSAND/UL (ref 0–0.2)
IMM GRANULOCYTES NFR BLD AUTO: 0 % (ref 0–2)
KETONES UR STRIP-MCNC: ABNORMAL MG/DL
LEUKOCYTE ESTERASE UR QL STRIP: NEGATIVE
LIPASE SERPL-CCNC: 9 U/L (ref 11–82)
LYMPHOCYTES # BLD AUTO: 2.66 THOUSANDS/ÂΜL (ref 0.6–4.47)
LYMPHOCYTES NFR BLD AUTO: 25 % (ref 14–44)
MCH RBC QN AUTO: 27.8 PG (ref 26.8–34.3)
MCHC RBC AUTO-ENTMCNC: 33 G/DL (ref 31.4–37.4)
MCV RBC AUTO: 84 FL (ref 82–98)
MONOCYTES # BLD AUTO: 0.59 THOUSAND/ÂΜL (ref 0.17–1.22)
MONOCYTES NFR BLD AUTO: 6 % (ref 4–12)
MUCOUS THREADS UR QL AUTO: ABNORMAL
NEUTROPHILS # BLD AUTO: 7.01 THOUSANDS/ÂΜL (ref 1.85–7.62)
NEUTS SEG NFR BLD AUTO: 64 % (ref 43–75)
NITRITE UR QL STRIP: NEGATIVE
NON-SQ EPI CELLS URNS QL MICRO: ABNORMAL /HPF
NRBC BLD AUTO-RTO: 0 /100 WBCS
PH UR STRIP.AUTO: 5.5 [PH]
PLATELET # BLD AUTO: 316 THOUSANDS/UL (ref 149–390)
PMV BLD AUTO: 10.5 FL (ref 8.9–12.7)
POTASSIUM SERPL-SCNC: 3.8 MMOL/L (ref 3.5–5.3)
PROT SERPL-MCNC: 7 G/DL (ref 6.4–8.4)
PROT UR STRIP-MCNC: NEGATIVE MG/DL
RBC # BLD AUTO: 4.54 MILLION/UL (ref 3.81–5.12)
RBC #/AREA URNS AUTO: ABNORMAL /HPF
SODIUM SERPL-SCNC: 137 MMOL/L (ref 135–147)
SP GR UR STRIP.AUTO: 1.02 (ref 1–1.03)
UROBILINOGEN UR STRIP-ACNC: <2 MG/DL
WBC # BLD AUTO: 10.82 THOUSAND/UL (ref 4.31–10.16)
WBC #/AREA URNS AUTO: ABNORMAL /HPF

## 2023-05-21 RX ORDER — KETOROLAC TROMETHAMINE 30 MG/ML
15 INJECTION, SOLUTION INTRAMUSCULAR; INTRAVENOUS ONCE
Status: COMPLETED | OUTPATIENT
Start: 2023-05-21 | End: 2023-05-21

## 2023-05-21 RX ADMIN — IOHEXOL 100 ML: 350 INJECTION, SOLUTION INTRAVENOUS at 18:58

## 2023-05-21 RX ADMIN — KETOROLAC TROMETHAMINE 15 MG: 30 INJECTION, SOLUTION INTRAMUSCULAR; INTRAVENOUS at 18:17

## 2023-05-21 NOTE — ED PROVIDER NOTES
"History  Chief Complaint   Patient presents with   • Abdominal Pain     Patient \"reports mid abd pain radiating towards back, states she feels bloated, heavy vaginal bleeding\"     48 yo with abd pain starting today  Periumbilical  Constant, worsened by movement and palpation  Described as bloating and pressure  Having vaginal bleeding for past two days  LMP 6 months ago  Perimenopausal  No chest pain, sob  No n/v  Normal urination and BM  No fever or chills  No trauma  Reports prior h/o fibroid  History provided by:  Patient   used: No        Prior to Admission Medications   Prescriptions Last Dose Informant Patient Reported? Taking? CIPROFLOXACIN PO   Yes No   Sig: Take by mouth   METRONIDAZOLE PO   Yes No   Sig: Take by mouth   metFORMIN (GLUCOPHAGE) 500 mg tablet   Yes No   Sig: Take 500 mg by mouth 2 (two) times a day with meals   naproxen (NAPROSYN) 500 mg tablet   No No   Sig: Take 1 tablet (500 mg total) by mouth 2 (two) times a day with meals   ondansetron (ZOFRAN-ODT) 4 mg disintegrating tablet   No No   Sig: Take 1 tablet (4 mg total) by mouth every 6 (six) hours as needed for nausea or vomiting      Facility-Administered Medications: None       Past Medical History:   Diagnosis Date   • Diabetes mellitus ([facility] [address] )    • Diverticulitis    • Hypertension        No past surgical history on file  No family history on file  I have reviewed and agree with the history as documented  E-Cigarette/Vaping     E-Cigarette/Vaping Substances     Social History     Tobacco Use   • Smoking status: Every Day     Packs/day: 1 00     Types: Cigarettes   • Smokeless tobacco: Never   Substance Use Topics   • Alcohol use: Not Currently   • Drug use: Not Currently       Review of Systems   Constitutional: Negative for chills and fever  HENT: Negative for ear pain and sore throat  Eyes: Negative for pain and visual disturbance  Respiratory: Negative for cough and shortness of breath    " Cardiovascular: Negative for chest pain and palpitations  Gastrointestinal: Positive for abdominal pain  Negative for vomiting  Genitourinary: Positive for vaginal bleeding  Negative for dysuria and hematuria  Musculoskeletal: Negative for arthralgias and back pain  Skin: Negative for color change and rash  Neurological: Negative for seizures and syncope  All other systems reviewed and are negative  Physical Exam  Physical Exam  Vitals and nursing note reviewed  Constitutional:       General: She is not in acute distress  Appearance: She is well-developed  HENT:      Head: Normocephalic and atraumatic  Eyes:      Conjunctiva/sclera: Conjunctivae normal    Cardiovascular:      Rate and Rhythm: Normal rate and regular rhythm  Heart sounds: No murmur heard  Pulmonary:      Effort: Pulmonary effort is normal  No respiratory distress  Breath sounds: Normal breath sounds  Abdominal:      Palpations: Abdomen is soft  Tenderness: There is abdominal tenderness in the periumbilical area  Musculoskeletal:         General: No swelling  Cervical back: Neck supple  Skin:     General: Skin is warm and dry  Capillary Refill: Capillary refill takes less than 2 seconds  Neurological:      Mental Status: She is alert     Psychiatric:         Mood and Affect: Mood normal          Vital Signs  ED Triage Vitals   Temperature Pulse Respirations Blood Pressure SpO2   05/21/23 1754 05/21/23 1754 05/21/23 1754 05/21/23 1754 05/21/23 1754   98 4 °F (36 9 °C) 92 22 (!) 180/79 99 %      Temp Source Heart Rate Source Patient Position - Orthostatic VS BP Location FiO2 (%)   05/21/23 1754 05/21/23 1754 05/21/23 1754 05/21/23 1754 --   Oral Monitor Sitting Left arm       Pain Score       05/21/23 1817       7           Vitals:    05/21/23 1754   BP: (!) 180/79   Pulse: 92   Patient Position - Orthostatic VS: Sitting         Visual Acuity      ED Medications  Medications   ketorolac (TORADOL) injection 15 mg (15 mg Intravenous Given 5/21/23 1817)   iohexol (OMNIPAQUE) 350 MG/ML injection (MULTI-DOSE) 100 mL (100 mL Intravenous Given 5/21/23 1858)       Diagnostic Studies  Results Reviewed     Procedure Component Value Units Date/Time    Comprehensive metabolic panel [[id number]]  (Abnormal) Collected: 05/21/23 1818    Lab Status: Final result Specimen: Blood from Arm, Left Updated: 05/21/23 1845     Sodium 137 mmol/L      Potassium 3 8 mmol/L      Chloride 106 mmol/L      CO2 25 mmol/L      ANION GAP 6 mmol/L      BUN 10 mg/dL      Creatinine 0 62 mg/dL      Glucose 165 mg/dL      Calcium 9 1 mg/dL      AST 19 U/L      ALT 20 U/L      Alkaline Phosphatase 60 U/L      Total Protein 7 0 g/dL      Albumin 4 2 g/dL      Total Bilirubin 0 54 mg/dL      eGFR 104 ml/min/1 73sq m     Narrative:      [place] guidelines for Chronic Kidney Disease (CKD):   •  Stage 1 with normal or high GFR (GFR > 90 mL/min/1 73 square meters)  •  Stage 2 Mild CKD (GFR = 60-89 mL/min/1 73 square meters)  •  Stage 3A Moderate CKD (GFR = 45-59 mL/min/1 73 square meters)  •  Stage 3B Moderate CKD (GFR = 30-44 mL/min/1 73 square meters)  •  Stage 4 Severe CKD (GFR = 15-29 mL/min/1 73 square meters)  •  Stage 5 End Stage CKD (GFR <15 mL/min/1 73 square meters)  Note: GFR calculation is accurate only with a steady state creatinine    Lipase [[id number]]  (Abnormal) Collected: 05/21/23 1818    Lab Status: Final result Specimen: Blood from Arm, Left Updated: 05/21/23 1845     Lipase 9 u/L     Urine Microscopic [[id number]]  (Abnormal) Collected: 05/21/23 1818    Lab Status: Final result Specimen: Urine, Clean Catch Updated: 05/21/23 1840     RBC, UA 1-2 /hpf      WBC, UA 1-2 /hpf      Epithelial Cells Occasional /hpf      Bacteria, UA None Seen /hpf      MUCUS THREADS Occasional    POCT pregnancy, urine [[id number]]  (Normal) Resulted: 05/21/23 1839    Lab Status: Final result Updated: 05/21/23 1839     EXT Preg Test, Ur Negative     Control Valid    UA w Reflex to Microscopic w Reflex to Culture [[id number]]  (Abnormal) Collected: 05/21/23 1818    Lab Status: Final result Specimen: Urine, Clean Catch Updated: 05/21/23 1839     Color, UA Yellow     Clarity, UA Clear     Specific Gravity, UA 1 023     pH, UA 5 5     Leukocytes, UA Negative     Nitrite, UA Negative     Protein, UA Negative mg/dl      Glucose, UA Negative mg/dl      Ketones, UA Trace mg/dl      Urobilinogen, UA <2 0 mg/dl      Bilirubin, UA Negative     Occult Blood, UA Moderate    CBC and differential [[id number]]  (Abnormal) Collected: 05/21/23 1818    Lab Status: Final result Specimen: Blood from Arm, Left Updated: 05/21/23 1830     WBC 10 82 Thousand/uL      RBC 4 54 Million/uL      Hemoglobin 12 6 g/dL      Hematocrit 38 2 %      MCV 84 fL      MCH 27 8 pg      MCHC 33 0 g/dL      RDW 13 3 %      MPV 10 5 fL      Platelets 721 Thousands/uL      nRBC 0 /100 WBCs      Neutrophils Relative 64 %      Immat GRANS % 0 %      Lymphocytes Relative 25 %      Monocytes Relative 6 %      Eosinophils Relative 4 %      Basophils Relative 1 %      Neutrophils Absolute 7 01 Thousands/µL      Immature Grans Absolute 0 02 Thousand/uL      Lymphocytes Absolute 2 66 Thousands/µL      Monocytes Absolute 0 59 Thousand/µL      Eosinophils Absolute 0 45 Thousand/µL      Basophils Absolute 0 09 Thousands/µL                  CT abdomen pelvis with contrast   Final Result by Victorino Nunn MD (05/21 1934)      No acute findings in the abdomen or pelvis  Workstation performed: [id number]                    Procedures  Procedures         ED Course                               SBIRT 22yo+    Flowsheet Row Most Recent Value   Initial Alcohol Screen: US AUDIT-C     1  How often do you have a drink containing alcohol? 0 Filed at: 05/21/2023 1756   2  How many drinks containing alcohol do you have on a typical day you are drinking? 0 Filed at: 05/21/2023 1756   3a   Male UNDER 65: How often do you have five or more drinks on one occasion? 0 Filed at: 05/21/2023 1756   3b  FEMALE Any Age, or MALE 65+: How often do you have 4 or more drinks on one occassion? 0 Filed at: 05/21/2023 1756   Audit-C Score 0 Filed at: 05/21/2023 1756   SALLY: How many times in the past year have you    Used an illegal drug or used a prescription medication for non-medical reasons? Never Filed at: 05/21/2023 1756                    Medical Decision Making  DDx including but not limited to: appendicitis, gastroenteritis, gastritis, PUD, GERD, gastroparesis, hepatitis, pancreatitis, colitis, enteritis, diverticulitis, food poisoning, epiploic appendagitis, mesenteric adenitis, mesenteric panniculitis, mesenteric ischemia, IBD, IBS, ileus, bowel obstruction, volvulus, internal hernia, cholecystitis, biliary colic, choledocholithiasis, perforated viscus, tumor, splenic etiology, constipation, AAA, pyelonephritis, UTI; doubt cardiac etiology  Plan: Ct a/p  Labs  dispo pending  MDM: 46 yo with abd pain and DUB  Labs unremarkable  CT unremarkable  Pain could be related to her DUB  Recommended nsaids  GYN f/u  Return parameters provided  Pt understands and agrees with plan  Amount and/or Complexity of Data Reviewed  Labs: ordered  Radiology: ordered  Risk  Prescription drug management  Disposition  Final diagnoses:   Nonspecific abdominal pain     Time reflects when diagnosis was documented in both MDM as applicable and the Disposition within this note     Time User Action Codes Description Comment    5/21/2023  7:43 PM Carlos Eng Add [R10 9] Nonspecific abdominal pain       ED Disposition     ED Disposition   Discharge    Condition   Stable    Date/Time   Sun May 21, 2023  7:43 PM    Comment   Zo Pinedo discharge to home/self care                 Follow-up Information     Follow up With Specialties Details Why Contact Info Additional [address] [facility] Emergency Department Emergency Medicine Go to  If symptoms worsen 34 [facility] [address] [facility] Emergency Department, [address] [place], [place], [address] [place] [address] Obstetrics and Gynecology   [address] [facility] [id number]  [address] [facility] Obstetrics & Gynecology [address], [address] [place], [place], [address]          Discharge Medication List as of 5/21/2023  7:44 PM      CONTINUE these medications which have NOT CHANGED    Details   CIPROFLOXACIN PO Take by mouth, Historical Med      metFORMIN (GLUCOPHAGE) 500 mg tablet Take 500 mg by mouth 2 (two) times a day with meals, Historical Med      METRONIDAZOLE PO Take by mouth, Historical Med      naproxen (NAPROSYN) 500 mg tablet Take 1 tablet (500 mg total) by mouth 2 (two) times a day with meals, Starting Sat 10/17/2020, Print      ondansetron (ZOFRAN-ODT) 4 mg disintegrating tablet Take 1 tablet (4 mg total) by mouth every 6 (six) hours as needed for nausea or vomiting, Starting Sat 10/17/2020, Print             No discharge procedures on file      PDMP Review     None          ED Provider  Electronically Signed by           Dwayne Evans PA-C  05/21/23 8655

## 2023-10-09 ENCOUNTER — HOSPITAL ENCOUNTER (EMERGENCY)
Facility: HOSPITAL | Age: 53
Discharge: HOME | End: 2023-10-09
Attending: EMERGENCY MEDICINE | Admitting: EMERGENCY MEDICINE

## 2023-10-09 VITALS
TEMPERATURE: 98.4 F | DIASTOLIC BLOOD PRESSURE: 82 MMHG | OXYGEN SATURATION: 98 % | HEART RATE: 114 BPM | SYSTOLIC BLOOD PRESSURE: 146 MMHG | WEIGHT: 178 LBS | RESPIRATION RATE: 18 BRPM

## 2023-10-09 DIAGNOSIS — J02.9 PHARYNGITIS: Primary | ICD-10-CM

## 2023-10-09 LAB
FLUAV RNA RESP QL NAA+PROBE: NEGATIVE
FLUBV RNA RESP QL NAA+PROBE: NEGATIVE
RSV RNA RESP QL NAA+PROBE: NEGATIVE
S PYO DNA THROAT QL NAA+PROBE: NOT DETECTED
SARS-COV-2 RNA RESP QL NAA+PROBE: NEGATIVE

## 2023-10-09 PROCEDURE — 99283 EMERGENCY DEPT VISIT LOW MDM: CPT

## 2023-10-09 PROCEDURE — 0241U HB NFCT DS VIR RESP RNA 4 TRGT: CPT | Performed by: EMERGENCY MEDICINE

## 2023-10-09 PROCEDURE — 87651 STREP A DNA AMP PROBE: CPT | Performed by: EMERGENCY MEDICINE

## 2023-10-09 RX ORDER — AMOXICILLIN 500 MG/1
500 CAPSULE ORAL 3 TIMES DAILY
Qty: 30 CAPSULE | Refills: 0 | Status: SHIPPED | OUTPATIENT
Start: 2023-10-09 | End: 2023-10-19

## 2023-10-09 RX ORDER — PREDNISONE 20 MG/1
40 TABLET ORAL DAILY
Qty: 10 TABLET | Refills: 0 | Status: SHIPPED | OUTPATIENT
Start: 2023-10-11

## 2023-10-09 RX ORDER — AMOXICILLIN 250 MG/1
500 CAPSULE ORAL ONCE
Status: COMPLETED | OUTPATIENT
Start: 2023-10-09 | End: 2023-10-09

## 2023-10-09 RX ADMIN — DEXAMETHASONE SODIUM PHOSPHATE 6 MG: 10 INJECTION, SOLUTION INTRAMUSCULAR; INTRAVENOUS at 15:42

## 2023-10-09 RX ADMIN — AMOXICILLIN 500 MG: 250 CAPSULE ORAL at 15:41

## 2023-10-09 NOTE — ED PROVIDER NOTES
History  Chief Complaint   Patient presents with   • Flu Symptoms     Pt with flu like symptoms, took covid test at home that was negative. Pt thinks she has strep, white patches in the back of her throat. 46 yo female with 2 days of generalized fatigue, sore throat and URI sxs. Took a home covid test was negative. Concerned she may have strep. No cp, sob, syncope, abdominal pain or fever. Prior to Admission Medications   Prescriptions Last Dose Informant Patient Reported? Taking? CIPROFLOXACIN PO   Yes No   Sig: Take by mouth   METRONIDAZOLE PO   Yes No   Sig: Take by mouth   metFORMIN (GLUCOPHAGE) 500 mg tablet   Yes No   Sig: Take 500 mg by mouth 2 (two) times a day with meals   naproxen (NAPROSYN) 500 mg tablet   No No   Sig: Take 1 tablet (500 mg total) by mouth 2 (two) times a day with meals   ondansetron (ZOFRAN-ODT) 4 mg disintegrating tablet   No No   Sig: Take 1 tablet (4 mg total) by mouth every 6 (six) hours as needed for nausea or vomiting      Facility-Administered Medications: None       Past Medical History:   Diagnosis Date   • Diabetes mellitus ([address])    • Diverticulitis    • Hypertension        History reviewed. No pertinent surgical history. History reviewed. No pertinent family history. I have reviewed and agree with the history as documented. E-Cigarette/Vaping     E-Cigarette/Vaping Substances     Social History     Tobacco Use   • Smoking status: Every Day     Packs/day: 1.00     Types: Cigarettes   • Smokeless tobacco: Never   Substance Use Topics   • Alcohol use: Not Currently   • Drug use: Not Currently       Review of Systems   Constitutional: Negative for chills and fever. HENT: Positive for sore throat. Negative for trouble swallowing and voice change. Respiratory: Negative for cough and shortness of breath. Cardiovascular: Negative for chest pain and leg swelling. Gastrointestinal: Negative for abdominal distention and abdominal pain.    Endocrine: Negative for polydipsia and polyuria. Neurological: Negative for syncope and weakness. Physical Exam  Physical Exam  Vitals and nursing note reviewed. Constitutional:       General: She is not in acute distress. Appearance: Normal appearance. She is well-developed. She is not ill-appearing, toxic-appearing or diaphoretic. HENT:      Head: Normocephalic and atraumatic. Mouth/Throat:      Mouth: Mucous membranes are moist.      Pharynx: Oropharyngeal exudate and posterior oropharyngeal erythema present. Comments: Moderate pharyngeal erythema and b/l L greater than R exudate without tonsillar swelling or evidence of PTA. Uvula midline. Voice normal.   Eyes:      Conjunctiva/sclera: Conjunctivae normal.      Pupils: Pupils are equal, round, and reactive to light. Neck:      Vascular: No JVD. Cardiovascular:      Rate and Rhythm: Regular rhythm. Tachycardia present. Pulses: Normal pulses. Heart sounds: Normal heart sounds. No murmur heard. Pulmonary:      Effort: Pulmonary effort is normal. No respiratory distress. Breath sounds: Normal breath sounds. No stridor. No wheezing or rhonchi. Abdominal:      General: There is no distension. Palpations: Abdomen is soft. Tenderness: There is no abdominal tenderness. There is no guarding or rebound. Musculoskeletal:         General: No tenderness or deformity. Normal range of motion. Cervical back: Normal range of motion and neck supple. No rigidity. Lymphadenopathy:      Cervical: Cervical adenopathy present. Skin:     General: Skin is warm and dry. Capillary Refill: Capillary refill takes less than 2 seconds. Coloration: Skin is not jaundiced or pale. Findings: No bruising, erythema, lesion or rash. Neurological:      General: No focal deficit present. Mental Status: She is alert and oriented to person, place, and time. Cranial Nerves: No cranial nerve deficit.       Sensory: No sensory deficit. Motor: No weakness or abnormal muscle tone. Coordination: Coordination normal.      Gait: Gait normal.         Vital Signs  ED Triage Vitals [10/09/23 1309]   Temperature Pulse Respirations Blood Pressure SpO2   98.4 °F (36.9 °C) (!) 114 18 146/82 98 %      Temp Source Heart Rate Source Patient Position - Orthostatic VS BP Location FiO2 (%)   Temporal Monitor Sitting Left arm --      Pain Score       --           Vitals:    10/09/23 1309   BP: 146/82   Pulse: (!) 114   Patient Position - Orthostatic VS: Sitting         Visual Acuity      ED Medications  Medications   dexamethasone oral liquid 6 mg 0.6 mL (6 mg Oral Given 10/9/23 1542)   amoxicillin (AMOXIL) capsule 500 mg (500 mg Oral Given 10/9/23 1541)       Diagnostic Studies  Results Reviewed     Procedure Component Value Units Date/Time    COVID/FLU/RSV [[id number]]  (Normal) Collected: 10/09/23 1313    Lab Status: Final result Specimen: Nares from Nose Updated: 10/09/23 1401     SARS-CoV-2 Negative     INFLUENZA A PCR Negative     INFLUENZA B PCR Negative     RSV PCR Negative    Narrative:      FOR PEDIATRIC PATIENTS - copy/paste COVID Guidelines URL to browser: [email or web address]    SARS-CoV-2 assay is a Nucleic Acid Amplification assay intended for the  qualitative detection of nucleic acid from SARS-CoV-2 in nasopharyngeal  swabs. Results are for the presumptive identification of SARS-CoV-2 RNA. Positive results are indicative of infection with SARS-CoV-2, the virus  causing COVID-19, but do not rule out bacterial infection or co-infection  with other viruses. Laboratories within the [facility] and its  territories are required to report all positive results to the appropriate  public health authorities. Negative results do not preclude SARS-CoV-2  infection and should not be used as the sole basis for treatment or other  patient management decisions.  Negative results must be combined with  clinical observations, patient history, and epidemiological information. This test has not been FDA cleared or approved. This test has been authorized by FDA under an Emergency Use Authorization  (EUA). This test is only authorized for the duration of time the  declaration that circumstances exist justifying the authorization of the  emergency use of an in vitro diagnostic tests for detection of SARS-CoV-2  virus and/or diagnosis of COVID-19 infection under section 564(b)(1) of  the Act, 21 U. S.C. 836SHK-6(L)(3), unless the authorization is terminated  or revoked sooner. The test has been validated but independent review by FDA  and CLIA is pending. Test performed using [employer] GeneXpert: This RT-PCR assay targets N2,  a region unique to SARS-CoV-2. A conserved region in the E-gene was chosen  for pan-Sarbecovirus detection which includes SARS-CoV-2. According to CMS-2020-01-R, this platform meets the definition of high-throughput technology. Strep A PCR [[id number]]  (Normal) Collected: 10/09/23 1313    Lab Status: Final result Specimen: Throat Updated: 10/09/23 1349     STREP A PCR Not Detected                 No orders to display              Procedures  Procedures         ED Course                                             Medical Decision Making  Pharyngitis: complicated acute illness or injury with systemic symptoms     Details: will treat empirically for strep in spite of negative test due to poor NPV of same and presence of tonsillar exudate, cervical lymphadenopathy and absence of cough. she is mildly tachycardic but is otherwise well appearing in no distress and appears well hydrated, i think outpt tx is reasonable at this time and she is agreeable with this plan. she was advised to RTED if she has new or worsening or any other concerning sxs. Amount and/or Complexity of Data Reviewed  Labs: ordered. Risk  Prescription drug management.           Disposition  Final diagnoses:   Pharyngitis     Time reflects when diagnosis was documented in both MDM as applicable and the Disposition within this note     Time User Action Codes Description Comment    10/9/2023  3:35 PM Rin Hendricks Add [J02.9] Pharyngitis       ED Disposition     ED Disposition   Discharge    Condition   Stable    Date/Time   Mon Oct 9, 2023  3:35 PM    Comment   Zo Pinedo discharge to home/self care. Follow-up Information    None         Discharge Medication List as of 10/9/2023  3:36 PM      START taking these medications    Details   amoxicillin (AMOXIL) 500 mg capsule Take 1 capsule (500 mg total) by mouth 3 (three) times a day for 10 days, Starting Mon 10/9/2023, Until Thu 10/19/2023, Print      predniSONE 20 mg tablet Take 2 tablets (40 mg total) by mouth daily Do not start before October 11, 2023., Starting Wed 10/11/2023, Print         CONTINUE these medications which have NOT CHANGED    Details   CIPROFLOXACIN PO Take by mouth, Historical Med      metFORMIN (GLUCOPHAGE) 500 mg tablet Take 500 mg by mouth 2 (two) times a day with meals, Historical Med      METRONIDAZOLE PO Take by mouth, Historical Med      naproxen (NAPROSYN) 500 mg tablet Take 1 tablet (500 mg total) by mouth 2 (two) times a day with meals, Starting Sat 10/17/2020, Print      ondansetron (ZOFRAN-ODT) 4 mg disintegrating tablet Take 1 tablet (4 mg total) by mouth every 6 (six) hours as needed for nausea or vomiting, Starting Sat 10/17/2020, Print             No discharge procedures on file.     PDMP Review     None          ED Provider  Electronically Signed by           Saima Gaffney MD  10/09/23 8577

## 2023-10-18 ENCOUNTER — APPOINTMENT (EMERGENCY)
Dept: RADIOLOGY | Facility: HOSPITAL | Age: 53
End: 2023-10-18
Payer: COMMERCIAL

## 2023-10-18 ENCOUNTER — APPOINTMENT (EMERGENCY)
Dept: CT IMAGING | Facility: HOSPITAL | Age: 53
End: 2023-10-18
Payer: COMMERCIAL

## 2023-10-18 ENCOUNTER — HOSPITAL ENCOUNTER (EMERGENCY)
Facility: HOSPITAL | Age: 53
Discharge: HOME | End: 2023-10-18
Attending: EMERGENCY MEDICINE
Payer: COMMERCIAL

## 2023-10-18 VITALS
DIASTOLIC BLOOD PRESSURE: 70 MMHG | RESPIRATION RATE: 18 BRPM | HEART RATE: 78 BPM | SYSTOLIC BLOOD PRESSURE: 145 MMHG | OXYGEN SATURATION: 96 % | WEIGHT: 178 LBS | TEMPERATURE: 97.1 F

## 2023-10-18 DIAGNOSIS — S09.90XA INJURY OF HEAD, INITIAL ENCOUNTER: Primary | ICD-10-CM

## 2023-10-18 DIAGNOSIS — M25.512 ACUTE PAIN OF LEFT SHOULDER: ICD-10-CM

## 2023-10-18 DIAGNOSIS — S49.92XA SHOULDER INJURY, LEFT, INITIAL ENCOUNTER: ICD-10-CM
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
## 2023-10-18 DIAGNOSIS — S89.92XA LEFT KNEE INJURY, INITIAL ENCOUNTER: ICD-10-CM

## 2023-10-18 DIAGNOSIS — M25.562 ACUTE PAIN OF LEFT KNEE: ICD-10-CM

## 2023-10-18 PROCEDURE — 73564 X-RAY EXAM KNEE 4 OR MORE: CPT

## 2023-10-18 PROCEDURE — 73030 X-RAY EXAM OF SHOULDER: CPT

## 2023-10-18 PROCEDURE — G1004 CDSM NDSC: HCPCS

## 2023-10-18 PROCEDURE — 70450 CT HEAD/BRAIN W/O DYE: CPT

## 2023-10-18 RX ORDER — ACETAMINOPHEN 325 MG/1
975 TABLET ORAL ONCE
Status: COMPLETED | OUTPATIENT
Start: 2023-10-18 | End: 2023-10-18

## 2023-10-18 RX ORDER — METHOCARBAMOL 500 MG/1
500 TABLET, FILM COATED ORAL ONCE
Status: COMPLETED | OUTPATIENT
Start: 2023-10-18 | End: 2023-10-18

## 2023-10-18 RX ORDER — IBUPROFEN 600 MG/1
600 TABLET ORAL ONCE
Status: COMPLETED | OUTPATIENT
Start: 2023-10-18 | End: 2023-10-18

## 2023-10-18 RX ORDER — METHOCARBAMOL 500 MG/1
500 TABLET, FILM COATED ORAL 3 TIMES DAILY PRN
Qty: 30 TABLET | Refills: 0 | Status: SHIPPED | OUTPATIENT
Start: 2023-10-18

## 2023-10-18 RX ADMIN — IBUPROFEN 600 MG: 600 TABLET, FILM COATED ORAL at 17:57

## 2023-10-18 RX ADMIN — METHOCARBAMOL 500 MG: 500 TABLET ORAL at 17:57

## 2023-10-18 RX ADMIN — ACETAMINOPHEN 975 MG: 325 TABLET, FILM COATED ORAL at 17:57
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
## 2023-10-18 NOTE — DISCHARGE INSTRUCTIONS
Please follow up PCP. If taking muscle relaxant Robaxin, please do not drive or operate heavy machinery or perform other dangerous tasks. Recommend tylenol 650 mg and ibuprofen 600 mg every 6 hours as needed for pain. Please return for severe chest pain, significant shortness of breath, severely worsening symptoms, or any other concerning signs or symptoms. Please refer to the following documents for additional instructions and return precautions.

## 2023-11-27 ENCOUNTER — HOSPITAL ENCOUNTER (EMERGENCY)
Facility: HOSPITAL | Age: 53
Discharge: HOME | End: 2023-11-27
Attending: EMERGENCY MEDICINE | Admitting: EMERGENCY MEDICINE

## 2023-11-27 VITALS
OXYGEN SATURATION: 97 % | HEART RATE: 77 BPM | TEMPERATURE: 97 F | RESPIRATION RATE: 18 BRPM | DIASTOLIC BLOOD PRESSURE: 82 MMHG | SYSTOLIC BLOOD PRESSURE: 181 MMHG

## 2023-11-27 DIAGNOSIS — K04.7 PERIAPICAL ABSCESS: ICD-10-CM

## 2023-11-27 DIAGNOSIS — K08.89 PAIN, DENTAL: Primary | ICD-10-CM

## 2023-11-27 PROCEDURE — 99284 EMERGENCY DEPT VISIT MOD MDM: CPT | Performed by: EMERGENCY MEDICINE

## 2023-11-27 PROCEDURE — 99282 EMERGENCY DEPT VISIT SF MDM: CPT

## 2023-11-27 RX ORDER — KETOROLAC TROMETHAMINE 10 MG/1
10 TABLET, FILM COATED ORAL EVERY 6 HOURS PRN
Qty: 15 TABLET | Refills: 0 | Status: SHIPPED | OUTPATIENT
Start: 2023-11-27

## 2023-11-27 RX ORDER — PREDNISONE 50 MG/1
50 TABLET ORAL DAILY
Qty: 5 TABLET | Refills: 0 | Status: SHIPPED | OUTPATIENT
Start: 2023-11-27

## 2023-11-27 RX ORDER — CLINDAMYCIN HYDROCHLORIDE 300 MG/1
300 CAPSULE ORAL 4 TIMES DAILY
Qty: 28 CAPSULE | Refills: 0 | Status: SHIPPED | OUTPATIENT
Start: 2023-11-27 | End: 2023-12-04

## 2023-11-27 NOTE — DISCHARGE INSTRUCTIONS
A  personal message from Dr. Dani Hoffman,  Thank you so much for allowing me to care for you today. I pride myself in the care and attention I give all my patients. I hope you were a witness to this tonight. If for any reason your condition does not improve or worsens, or you have a question that was not answered during your visit you can feel free to text me on my personal phone #  # [phone]. I will answer to your message and continue your care past your emergency room visit. Please understand that although you are being discharged because your condition has been deemed stable and able to be managed on an outpatient setting. However your condition may worsen as part of the natural progression of the illness/condition, if this occurs please come back to the emergency department for a repeat evaluation.

## 2023-11-28 NOTE — ED PROVIDER NOTES
History  Chief Complaint   Patient presents with    Dental Pain     Patient c/o left upper dental pain. Patient reports a broken tooth. Patient reports facial pain and swelling. Patient reports pain in left eye socket. Osiel Parada is a 47y.o.  year old female  Past Medical History:  No date: Diabetes mellitus ([address])  No date: Diverticulitis  No date: Hypertension  Social History    Tobacco Use      Smoking status: Former        Packs/day: 1.00        Types: Cigarettes      Smokeless tobacco: Never    Vaping Use      Vaping Use: Never used    Alcohol use: Not Currently    Drug use: Not Currently    Patient presents with:  Dental Pain: Patient c/o left upper dental pain. Patient reports a broken tooth. Patient reports facial pain and swelling. Patient reports pain in left eye socket. No fever no chills  Mod pain, constant     History obtained directly from the patient              Dental Pain  Associated symptoms: facial swelling    Associated symptoms: no fever        Prior to Admission Medications   Prescriptions Last Dose Informant Patient Reported? Taking? CIPROFLOXACIN PO   Yes No   Sig: Take by mouth   METRONIDAZOLE PO   Yes No   Sig: Take by mouth   metFORMIN (GLUCOPHAGE) 500 mg tablet   Yes No   Sig: Take 500 mg by mouth 2 (two) times a day with meals   methocarbamol (ROBAXIN) 500 mg tablet   No No   Sig: Take 1 tablet (500 mg total) by mouth 3 (three) times a day as needed for muscle spasms   naproxen (NAPROSYN) 500 mg tablet   No No   Sig: Take 1 tablet (500 mg total) by mouth 2 (two) times a day with meals   ondansetron (ZOFRAN-ODT) 4 mg disintegrating tablet   No No   Sig: Take 1 tablet (4 mg total) by mouth every 6 (six) hours as needed for nausea or vomiting   predniSONE 20 mg tablet   No No   Sig: Take 2 tablets (40 mg total) by mouth daily Do not start before October 11, 2023.       Facility-Administered Medications: None       Past Medical History:   Diagnosis Date    Diabetes mellitus [facility])     Diverticulitis     Hypertension        History reviewed. No pertinent surgical history. History reviewed. No pertinent family history. I have reviewed and agree with the history as documented. E-Cigarette/Vaping    E-Cigarette Use Never User      E-Cigarette/Vaping Substances     Social History     Tobacco Use    Smoking status: Former     Packs/day: 1.00     Types: Cigarettes    Smokeless tobacco: Never   Vaping Use    Vaping Use: Never used   Substance Use Topics    Alcohol use: Not Currently    Drug use: Not Currently       Review of Systems   Constitutional:  Negative for chills and fever. HENT:  Positive for dental problem and facial swelling. Negative for ear pain and sore throat. Eyes:  Negative for pain and visual disturbance. Respiratory:  Negative for cough and shortness of breath. Cardiovascular:  Negative for chest pain and palpitations. Gastrointestinal:  Negative for abdominal pain and vomiting. Genitourinary:  Negative for dysuria and hematuria. Musculoskeletal:  Negative for arthralgias and back pain. Skin:  Negative for color change and rash. Neurological:  Negative for seizures and syncope. All other systems reviewed and are negative. Physical Exam  Physical Exam  Vitals reviewed. Constitutional:       Appearance: Normal appearance. HENT:      Head: Normocephalic. Right Ear: External ear normal.      Left Ear: External ear normal.      Mouth/Throat:      Mouth: Mucous membranes are moist.      Comments: Gingivitis  Swelling of gum w/o abscess  Mild facial swelling same side L maxillary   Eyes:      Pupils: Pupils are equal, round, and reactive to light. Cardiovascular:      Rate and Rhythm: Normal rate. Pulmonary:      Effort: Pulmonary effort is normal.   Skin:     Capillary Refill: Capillary refill takes less than 2 seconds. Neurological:      General: No focal deficit present.       Mental Status: She is alert and oriented to person, place, and time. Psychiatric:         Mood and Affect: Mood normal.         Thought Content: Thought content normal.         Vital Signs  ED Triage Vitals   Temperature Pulse Respirations Blood Pressure SpO2   11/27/23 1210 11/27/23 1210 11/27/23 1210 11/27/23 1210 11/27/23 1210   (!) 97 °F (36.1 °C) 77 18 (!) 181/82 97 %      Temp Source Heart Rate Source Patient Position - Orthostatic VS BP Location FiO2 (%)   11/27/23 1210 11/27/23 1210 11/27/23 1210 11/27/23 1210 --   Tympanic Monitor Sitting Left arm       Pain Score       11/27/23 1212       9           Vitals:    11/27/23 1210   BP: (!) 181/82   Pulse: 77   Patient Position - Orthostatic VS: Sitting         Visual Acuity      ED Medications  Medications - No data to display    Diagnostic Studies  Results Reviewed       None                   No orders to display              Procedures  Procedures         ED Course                               SBIRT 20yo+      Flowsheet Row Most Recent Value   Initial Alcohol Screen: US AUDIT-C     1. How often do you have a drink containing alcohol? 0 Filed at: 11/27/2023 1212   2. How many drinks containing alcohol do you have on a typical day you are drinking? 0 Filed at: 11/27/2023 1212   3a. Male UNDER 65: How often do you have five or more drinks on one occasion? 0 Filed at: 11/27/2023 1212   3b. FEMALE Any Age, or MALE 65+: How often do you have 4 or more drinks on one occassion? 0 Filed at: 11/27/2023 1212   Audit-C Score 0 Filed at: 11/27/2023 1212   SALLY: How many times in the past year have you. .. Used an illegal drug or used a prescription medication for non-medical reasons? Never Filed at: 11/27/2023 1212                      Medical Decision Making  Problems Addressed:  Pain, dental: acute illness or injury  Periapical abscess: acute illness or injury    Risk  OTC drugs. Prescription drug management.              Disposition  Final diagnoses:   Pain, dental   Periapical abscess     Time reflects when diagnosis was documented in both MDM as applicable and the Disposition within this note       Time User Action Codes Description Comment    11/27/2023 12:55 PM Grant Clayton [K08.89] Pain, dental     11/27/2023 12:55 PM Grant Clayton [K04.7] Periapical abscess           ED Disposition       ED Disposition   Discharge    Condition   Stable    Date/Time   Mon Nov 27, 2023 1255    Comment   Zo Pinedo discharge to home/self care. Follow-up Information       Follow up With Specialties Details Why [address]  In 1 week  [address] [address]  [phone]            Discharge Medication List as of 11/27/2023 12:57 PM        START taking these medications    Details   clindamycin (CLEOCIN) 300 MG capsule Take 1 capsule (300 mg total) by mouth 4 (four) times a day for 7 days, Starting Mon 11/27/2023, Until Mon 12/4/2023, Print      ketorolac (TORADOL) 10 mg tablet Take 1 tablet (10 mg total) by mouth every 6 (six) hours as needed for moderate pain, Starting Mon 11/27/2023, Print      !! predniSONE 50 mg tablet Take 1 tablet (50 mg total) by mouth daily, Starting Mon 11/27/2023, Print       !! - Potential duplicate medications found. Please discuss with provider.         CONTINUE these medications which have NOT CHANGED    Details   CIPROFLOXACIN PO Take by mouth, Historical Med      metFORMIN (GLUCOPHAGE) 500 mg tablet Take 500 mg by mouth 2 (two) times a day with meals, Historical Med      methocarbamol (ROBAXIN) 500 mg tablet Take 1 tablet (500 mg total) by mouth 3 (three) times a day as needed for muscle spasms, Starting Wed 10/18/2023, Normal      METRONIDAZOLE PO Take by mouth, Historical Med      naproxen (NAPROSYN) 500 mg tablet Take 1 tablet (500 mg total) by mouth 2 (two) times a day with meals, Starting Sat 10/17/2020, Print      ondansetron (ZOFRAN-ODT) 4 mg disintegrating tablet Take 1 tablet (4 mg total) by mouth every 6 (six) hours as needed for nausea or vomiting, Starting Sat 10/17/2020, Print      !! predniSONE 20 mg tablet Take 2 tablets (40 mg total) by mouth daily Do not start before October 11, 2023., Starting Wed 10/11/2023, Print       !! - Potential duplicate medications found. Please discuss with provider. No discharge procedures on file.     PDMP Review       None            ED Provider  Electronically Signed by             Jagdeep Villaseñor MD  11/27/23 8343

## 2024-01-14 ENCOUNTER — APPOINTMENT (EMERGENCY)
Dept: RADIOLOGY | Facility: HOSPITAL | Age: 54
End: 2024-01-14
Payer: COMMERCIAL

## 2024-01-14 ENCOUNTER — HOSPITAL ENCOUNTER (EMERGENCY)
Facility: HOSPITAL | Age: 54
Discharge: HOME | End: 2024-01-15
Attending: EMERGENCY MEDICINE
Payer: COMMERCIAL

## 2024-01-14 VITALS
OXYGEN SATURATION: 92 % | TEMPERATURE: 98.9 F | HEART RATE: 94 BPM | RESPIRATION RATE: 18 BRPM | SYSTOLIC BLOOD PRESSURE: 174 MMHG | DIASTOLIC BLOOD PRESSURE: 72 MMHG

## 2024-01-14 DIAGNOSIS — U07.1 COVID: Primary | ICD-10-CM

## 2024-01-14 LAB
ALBUMIN SERPL BCP-MCNC: 4 G/DL (ref 3.5–5)
ALP SERPL-CCNC: 68 U/L (ref 34–104)
ALT SERPL W P-5'-P-CCNC: 32 U/L (ref 7–52)
ANION GAP SERPL CALCULATED.3IONS-SCNC: 5 MMOL/L
AST SERPL W P-5'-P-CCNC: 25 U/L (ref 13–39)
BASOPHILS # BLD AUTO: 0.08 THOUSANDS/ÂΜL (ref 0–0.1)
BASOPHILS NFR BLD AUTO: 1 % (ref 0–1)
BILIRUB SERPL-MCNC: 0.22 MG/DL (ref 0.2–1)
BUN SERPL-MCNC: 14 MG/DL (ref 5–25)
CALCIUM SERPL-MCNC: 9 MG/DL (ref 8.4–10.2)
CARDIAC TROPONIN I PNL SERPL HS: 3 NG/L
CHLORIDE SERPL-SCNC: 105 MMOL/L (ref 96–108)
CO2 SERPL-SCNC: 29 MMOL/L (ref 21–32)
CREAT SERPL-MCNC: 0.74 MG/DL (ref 0.6–1.3)
EOSINOPHIL # BLD AUTO: 0.23 THOUSAND/ÂΜL (ref 0–0.61)
EOSINOPHIL NFR BLD AUTO: 3 % (ref 0–6)
ERYTHROCYTE [DISTWIDTH] IN BLOOD BY AUTOMATED COUNT: 12.8 % (ref 11.6–15.1)
FLUAV RNA RESP QL NAA+PROBE: NEGATIVE
FLUBV RNA RESP QL NAA+PROBE: NEGATIVE
GFR SERPL CREATININE-BSD FRML MDRD: 93 ML/MIN/1.73SQ M
GLUCOSE SERPL-MCNC: 144 MG/DL (ref 65–140)
HCT VFR BLD AUTO: 37.7 % (ref 34.8–46.1)
HGB BLD-MCNC: 12.2 G/DL (ref 11.5–15.4)
IMM GRANULOCYTES # BLD AUTO: 0.01 THOUSAND/UL (ref 0–0.2)
IMM GRANULOCYTES NFR BLD AUTO: 0 % (ref 0–2)
LYMPHOCYTES # BLD AUTO: 2.66 THOUSANDS/ÂΜL (ref 0.6–4.47)
LYMPHOCYTES NFR BLD AUTO: 39 % (ref 14–44)
MCH RBC QN AUTO: 26.9 PG (ref 26.8–34.3)
MCHC RBC AUTO-ENTMCNC: 32.4 G/DL (ref 31.4–37.4)
MCV RBC AUTO: 83 FL (ref 82–98)
MONOCYTES # BLD AUTO: 0.6 THOUSAND/ÂΜL (ref 0.17–1.22)
MONOCYTES NFR BLD AUTO: 9 % (ref 4–12)
NEUTROPHILS # BLD AUTO: 3.21 THOUSANDS/ÂΜL (ref 1.85–7.62)
NEUTS SEG NFR BLD AUTO: 48 % (ref 43–75)
NRBC BLD AUTO-RTO: 0 /100 WBCS
PLATELET # BLD AUTO: 277 THOUSANDS/UL (ref 149–390)
PMV BLD AUTO: 10.8 FL (ref 8.9–12.7)
POTASSIUM SERPL-SCNC: 4.4 MMOL/L (ref 3.5–5.3)
PROT SERPL-MCNC: 7 G/DL (ref 6.4–8.4)
RBC # BLD AUTO: 4.54 MILLION/UL (ref 3.81–5.12)
RSV RNA RESP QL NAA+PROBE: NEGATIVE
SARS-COV-2 RNA RESP QL NAA+PROBE: POSITIVE
SODIUM SERPL-SCNC: 139 MMOL/L (ref 135–147)
WBC # BLD AUTO: 6.79 THOUSAND/UL (ref 4.31–10.16)

## 2024-01-14 PROCEDURE — 93005 ELECTROCARDIOGRAM TRACING: CPT

## 2024-01-14 PROCEDURE — 84484 ASSAY OF TROPONIN QUANT: CPT | Performed by: EMERGENCY MEDICINE

## 2024-01-14 PROCEDURE — 71045 X-RAY EXAM CHEST 1 VIEW: CPT

## 2024-01-14 PROCEDURE — 36415 COLL VENOUS BLD VENIPUNCTURE: CPT | Performed by: EMERGENCY MEDICINE

## 2024-01-14 PROCEDURE — 94640 AIRWAY INHALATION TREATMENT: CPT

## 2024-01-14 PROCEDURE — 96374 THER/PROPH/DIAG INJ IV PUSH: CPT

## 2024-01-14 PROCEDURE — 99285 EMERGENCY DEPT VISIT HI MDM: CPT

## 2024-01-14 PROCEDURE — 80053 COMPREHEN METABOLIC PANEL: CPT | Performed by: EMERGENCY MEDICINE

## 2024-01-14 PROCEDURE — 0241U HB NFCT DS VIR RESP RNA 4 TRGT: CPT | Performed by: EMERGENCY MEDICINE

## 2024-01-14 PROCEDURE — 96375 TX/PRO/DX INJ NEW DRUG ADDON: CPT

## 2024-01-14 PROCEDURE — 85025 COMPLETE CBC W/AUTO DIFF WBC: CPT | Performed by: EMERGENCY MEDICINE

## 2024-01-14 RX ORDER — ALBUTEROL SULFATE 2.5 MG/3ML
5 SOLUTION RESPIRATORY (INHALATION) ONCE
Status: COMPLETED | OUTPATIENT
Start: 2024-01-14 | End: 2024-01-14

## 2024-01-14 RX ORDER — METHYLPREDNISOLONE SODIUM SUCCINATE 125 MG/2ML
125 INJECTION, POWDER, LYOPHILIZED, FOR SOLUTION INTRAMUSCULAR; INTRAVENOUS ONCE
Status: COMPLETED | OUTPATIENT
Start: 2024-01-14 | End: 2024-01-14

## 2024-01-14 RX ORDER — KETOROLAC TROMETHAMINE 30 MG/ML
15 INJECTION, SOLUTION INTRAMUSCULAR; INTRAVENOUS ONCE
Status: COMPLETED | OUTPATIENT
Start: 2024-01-14 | End: 2024-01-14

## 2024-01-14 RX ADMIN — KETOROLAC TROMETHAMINE 15 MG: 30 INJECTION, SOLUTION INTRAMUSCULAR; INTRAVENOUS at 21:03

## 2024-01-14 RX ADMIN — METHYLPREDNISOLONE SODIUM SUCCINATE 125 MG: 125 INJECTION, POWDER, FOR SOLUTION INTRAMUSCULAR; INTRAVENOUS at 21:03

## 2024-01-14 RX ADMIN — IPRATROPIUM BROMIDE 0.5 MG: 0.5 SOLUTION RESPIRATORY (INHALATION) at 21:03

## 2024-01-14 RX ADMIN — ALBUTEROL SULFATE 5 MG: 2.5 SOLUTION RESPIRATORY (INHALATION) at 21:03

## 2024-01-15 LAB
2HR DELTA HS TROPONIN: 0 NG/L
ATRIAL RATE: 82 BPM
CARDIAC TROPONIN I PNL SERPL HS: 3 NG/L
P AXIS: 32 DEGREES
PR INTERVAL: 126 MS
QRS AXIS: 40 DEGREES
QRSD INTERVAL: 76 MS
QT INTERVAL: 400 MS
QTC INTERVAL: 467 MS
T WAVE AXIS: 23 DEGREES
VENTRICULAR RATE: 82 BPM

## 2024-01-15 PROCEDURE — 99284 EMERGENCY DEPT VISIT MOD MDM: CPT | Performed by: EMERGENCY MEDICINE

## 2024-01-15 RX ORDER — PREDNISONE 20 MG/1
40 TABLET ORAL DAILY
Qty: 10 TABLET | Refills: 0 | Status: SHIPPED | OUTPATIENT
Start: 2024-01-15 | End: 2024-01-20

## 2024-01-15 RX ORDER — ALBUTEROL SULFATE 90 UG/1
2 AEROSOL, METERED RESPIRATORY (INHALATION) EVERY 6 HOURS PRN
Qty: 8.5 G | Refills: 0 | Status: SHIPPED | OUTPATIENT
Start: 2024-01-15

## 2024-01-31 NOTE — ED PROVIDER NOTES
History  Chief Complaint   Patient presents with    Chest Pain     Pt reports mid sternal chest pressure that started today. Denies radiating pains. + shortness of breath. Hx of asthma.      53-year-old female presents emergency department for evaluation of chest pain.  Patient reports substernal chest pain that began today, no radiation, associated with some shortness of breath, does have history of asthma.  Does have some cough and wheeze as well.        Prior to Admission Medications   Prescriptions Last Dose Informant Patient Reported? Taking?   CIPROFLOXACIN PO   Yes No   Sig: Take by mouth   METRONIDAZOLE PO   Yes No   Sig: Take by mouth   ketorolac (TORADOL) 10 mg tablet   No No   Sig: Take 1 tablet (10 mg total) by mouth every 6 (six) hours as needed for moderate pain   metFORMIN (GLUCOPHAGE) 500 mg tablet   Yes No   Sig: Take 500 mg by mouth 2 (two) times a day with meals   methocarbamol (ROBAXIN) 500 mg tablet   No No   Sig: Take 1 tablet (500 mg total) by mouth 3 (three) times a day as needed for muscle spasms   naproxen (NAPROSYN) 500 mg tablet   No No   Sig: Take 1 tablet (500 mg total) by mouth 2 (two) times a day with meals   ondansetron (ZOFRAN-ODT) 4 mg disintegrating tablet   No No   Sig: Take 1 tablet (4 mg total) by mouth every 6 (six) hours as needed for nausea or vomiting   predniSONE 20 mg tablet   No No   Sig: Take 2 tablets (40 mg total) by mouth daily Do not start before October 11, 2023.   predniSONE 50 mg tablet   No No   Sig: Take 1 tablet (50 mg total) by mouth daily      Facility-Administered Medications: None       Past Medical History:   Diagnosis Date    Asthma     Diabetes mellitus (HCC)     Diverticulitis     Hypertension        History reviewed. No pertinent surgical history.    History reviewed. No pertinent family history.  I have reviewed and agree with the history as documented.    E-Cigarette/Vaping    E-Cigarette Use Never User      E-Cigarette/Vaping Substances     Social  History     Tobacco Use    Smoking status: Former     Current packs/day: 1.00     Types: Cigarettes    Smokeless tobacco: Never   Vaping Use    Vaping status: Never Used   Substance Use Topics    Alcohol use: Not Currently    Drug use: Not Currently       Review of Systems   Constitutional:  Negative for appetite change, chills, fatigue and fever.   HENT:  Negative for sneezing and sore throat.    Eyes:  Negative for visual disturbance.   Respiratory:  Positive for cough, shortness of breath and wheezing. Negative for choking and chest tightness.    Cardiovascular:  Positive for chest pain. Negative for palpitations.   Gastrointestinal:  Negative for abdominal pain, constipation, diarrhea, nausea and vomiting.   Genitourinary:  Negative for difficulty urinating and dysuria.   Neurological:  Negative for dizziness, weakness, light-headedness, numbness and headaches.   All other systems reviewed and are negative.      Physical Exam  Physical Exam  Vitals and nursing note reviewed.   Constitutional:       General: She is not in acute distress.     Appearance: She is well-developed. She is not diaphoretic.   HENT:      Head: Normocephalic and atraumatic.   Eyes:      Pupils: Pupils are equal, round, and reactive to light.   Neck:      Vascular: No JVD.      Trachea: No tracheal deviation.   Cardiovascular:      Rate and Rhythm: Normal rate and regular rhythm.      Heart sounds: Normal heart sounds. No murmur heard.     No friction rub. No gallop.   Pulmonary:      Effort: Pulmonary effort is normal. No respiratory distress.      Breath sounds: Wheezing present. No rales.   Abdominal:      General: Bowel sounds are normal. There is no distension.      Palpations: Abdomen is soft.      Tenderness: There is no abdominal tenderness. There is no guarding or rebound.   Skin:     General: Skin is warm and dry.      Coloration: Skin is not pale.   Neurological:      Mental Status: She is alert and oriented to person, place, and  time.      Cranial Nerves: No cranial nerve deficit.      Motor: No abnormal muscle tone.   Psychiatric:         Behavior: Behavior normal.         Vital Signs  ED Triage Vitals   Temperature Pulse Respirations Blood Pressure SpO2   01/14/24 2043 01/14/24 2006 01/14/24 2006 01/14/24 2006 01/14/24 2006   98.9 °F (37.2 °C) 79 20 168/73 100 %      Temp Source Heart Rate Source Patient Position - Orthostatic VS BP Location FiO2 (%)   01/14/24 2006 01/14/24 2006 01/14/24 2006 01/14/24 2006 --   Tympanic Monitor Sitting Left arm       Pain Score       01/14/24 2103       7           Vitals:    01/14/24 2006 01/14/24 2045 01/14/24 2130 01/14/24 2300   BP: 168/73 135/72 161/72 (!) 174/72   Pulse: 79 81 73 94   Patient Position - Orthostatic VS: Sitting   Lying         Visual Acuity      ED Medications  Medications   albuterol inhalation solution 5 mg (5 mg Nebulization Given 1/14/24 2103)   ipratropium (ATROVENT) 0.02 % inhalation solution 0.5 mg (0.5 mg Nebulization Given 1/14/24 2103)   methylPREDNISolone sodium succinate (Solu-MEDROL) injection 125 mg (125 mg Intravenous Given 1/14/24 2103)   ketorolac (TORADOL) injection 15 mg (15 mg Intravenous Given 1/14/24 2103)       Diagnostic Studies  Results Reviewed       Procedure Component Value Units Date/Time    HS Troponin I 2hr [[id number]]  (Normal) Collected: 01/14/24 2355    Lab Status: Final result Specimen: Blood from Arm, Left Updated: 01/15/24 0029     hs TnI 2hr 3 ng/L      Delta 2hr hsTnI 0 ng/L     FLU/RSV/COVID - if FLU/RSV clinically relevant [[id number]]  (Abnormal) Collected: 01/14/24 2103    Lab Status: Final result Specimen: Nares from Nose Updated: 01/14/24 2229     SARS-CoV-2 Positive     INFLUENZA A PCR Negative     INFLUENZA B PCR Negative     RSV PCR Negative    Narrative:      FOR PEDIATRIC PATIENTS - copy/paste COVID Guidelines URL to browser: [email or web address]    SARS-CoV-2 assay is a Nucleic  Acid Amplification assay intended for the  qualitative detection of nucleic acid from SARS-CoV-2 in nasopharyngeal  swabs. Results are for the presumptive identification of SARS-CoV-2 RNA.    Positive results are indicative of infection with SARS-CoV-2, the virus  causing COVID-19, but do not rule out bacterial infection or co-infection  with other viruses. Laboratories within the [place] and its  territories are required to report all positive results to the appropriate  public health authorities. Negative results do not preclude SARS-CoV-2  infection and should not be used as the sole basis for treatment or other  patient management decisions. Negative results must be combined with  clinical observations, patient history, and epidemiological information.  This test has not been FDA cleared or approved.    This test has been authorized by FDA under an Emergency Use Authorization  (EUA). This test is only authorized for the duration of time the  declaration that circumstances exist justifying the authorization of the  emergency use of an in vitro diagnostic tests for detection of SARS-CoV-2  virus and/or diagnosis of COVID-19 infection under section 564(b)(1) of  the Act, 21 U.S.C. 360bbb-3(b)(1), unless the authorization is terminated  or revoked sooner. The test has been validated but independent review by FDA  and CLIA is pending.    Test performed using [employer] GeneXpert: This RT-PCR assay targets N2,  a region unique to SARS-CoV-2. A conserved region in the E-gene was chosen  for pan-Sarbecovirus detection which includes SARS-CoV-2.    According to CMS-2020-01-R, this platform meets the definition of high-throughput technology.    HS Troponin 0hr (reflex protocol) [[id number]]  (Normal) Collected: 01/14/24 2041    Lab Status: Final result Specimen: Blood from Arm, Left Updated: 01/14/24 2123     hs TnI 0hr 3 ng/L     Comprehensive metabolic panel [[id number]]  (Abnormal) Collected: 01/14/24 2041    Lab  Status: Final result Specimen: Blood from Arm, Left Updated: 01/14/24 2116     Sodium 139 mmol/L      Potassium 4.4 mmol/L      Chloride 105 mmol/L      CO2 29 mmol/L      ANION GAP 5 mmol/L      BUN 14 mg/dL      Creatinine 0.74 mg/dL      Glucose 144 mg/dL      Calcium 9.0 mg/dL      AST 25 U/L      ALT 32 U/L      Alkaline Phosphatase 68 U/L      Total Protein 7.0 g/dL      Albumin 4.0 g/dL      Total Bilirubin 0.22 mg/dL      eGFR 93 ml/min/1.73sq m     Narrative:      National Kidney Disease Foundation guidelines for Chronic Kidney Disease (CKD):     Stage 1 with normal or high GFR (GFR > 90 mL/min/1.73 square meters)    Stage 2 Mild CKD (GFR = 60-89 mL/min/1.73 square meters)    Stage 3A Moderate CKD (GFR = 45-59 mL/min/1.73 square meters)    Stage 3B Moderate CKD (GFR = 30-44 mL/min/1.73 square meters)    Stage 4 Severe CKD (GFR = 15-29 mL/min/1.73 square meters)    Stage 5 End Stage CKD (GFR <15 mL/min/1.73 square meters)  Note: GFR calculation is accurate only with a steady state creatinine    CBC and differential [[id number]] Collected: 01/14/24 2041    Lab Status: Final result Specimen: Blood from Arm, Left Updated: 01/14/24 2055     WBC 6.79 Thousand/uL      RBC 4.54 Million/uL      Hemoglobin 12.2 g/dL      Hematocrit 37.7 %      MCV 83 fL      MCH 26.9 pg      MCHC 32.4 g/dL      RDW 12.8 %      MPV 10.8 fL      Platelets 277 Thousands/uL      nRBC 0 /100 WBCs      Neutrophils Relative 48 %      Immat GRANS % 0 %      Lymphocytes Relative 39 %      Monocytes Relative 9 %      Eosinophils Relative 3 %      Basophils Relative 1 %      Neutrophils Absolute 3.21 Thousands/µL      Immature Grans Absolute 0.01 Thousand/uL      Lymphocytes Absolute 2.66 Thousands/µL      Monocytes Absolute 0.60 Thousand/µL      Eosinophils Absolute 0.23 Thousand/µL      Basophils Absolute 0.08 Thousands/µL                    XR chest 1 view portable   Final Result by Jonatan Rey MD (01/15 1539)      No acute  cardiopulmonary disease.                  Resident: MUKESH COLEY I, the attending radiologist, have reviewed the images and agree with the final report above.      Workstation performed: [id number]                    Procedures  Procedures         ED Course             HEART Risk Score      Flowsheet Row Most Recent Value   Heart Score Risk Calculator    History 0 Filed at: 01/15/2024 0048   ECG 0 Filed at: 01/15/2024 0048   Age 1 Filed at: 01/15/2024 0048   Risk Factors 1 Filed at: 01/15/2024 0048   Troponin 0 Filed at: 01/15/2024 0048   HEART Score 2 Filed at: 01/15/2024 0048                          SBIRT 22yo+      Flowsheet Row Most Recent Value   Initial Alcohol Screen: US AUDIT-C     1. How often do you have a drink containing alcohol? 0 Filed at: 01/14/2024 2009   2. How many drinks containing alcohol do you have on a typical day you are drinking?  0 Filed at: 01/14/2024 2009   3b. FEMALE Any Age, or MALE 65+: How often do you have 4 or more drinks on one occassion? 0 Filed at: 01/14/2024 2009   Audit-C Score 0 Filed at: 01/14/2024 2009   SALLY: How many times in the past year have you...    Used an illegal drug or used a prescription medication for non-medical reasons? Never Filed at: 01/14/2024 2009                      Medical Decision Making  53-year-old female with asthma exacerbation, will check swab, give nebs, check cardiac labs, reassess.    Amount and/or Complexity of Data Reviewed  Labs: ordered.  Radiology: ordered.    Risk  Prescription drug management.             Disposition  Final diagnoses:   COVID     Time reflects when diagnosis was documented in both MDM as applicable and the Disposition within this note       Time User Action Codes Description Comment    1/15/2024 12:49 AM Chandrakant Bojorquez Add [U07.1] COVID           ED Disposition       ED Disposition   Discharge    Condition   Stable    Date/Time   Mon Dominic 15, 2024 0049    Comment   Zo Pinedo discharge to home/self  care.                   Follow-up Information    None         Discharge Medication List as of 1/15/2024  1:08 AM        START taking these medications    Details   albuterol (ProAir HFA) 90 mcg/act inhaler Inhale 2 puffs every 6 (six) hours as needed for wheezing, Starting Mon 1/15/2024, Print      !! predniSONE 20 mg tablet Take 2 tablets (40 mg total) by mouth daily for 5 days, Starting Mon 1/15/2024, Until Sat 1/20/2024, Print       !! - Potential duplicate medications found. Please discuss with provider.        CONTINUE these medications which have NOT CHANGED    Details   CIPROFLOXACIN PO Take by mouth, Historical Med      ketorolac (TORADOL) 10 mg tablet Take 1 tablet (10 mg total) by mouth every 6 (six) hours as needed for moderate pain, Starting Mon 11/27/2023, Print      metFORMIN (GLUCOPHAGE) 500 mg tablet Take 500 mg by mouth 2 (two) times a day with meals, Historical Med      methocarbamol (ROBAXIN) 500 mg tablet Take 1 tablet (500 mg total) by mouth 3 (three) times a day as needed for muscle spasms, Starting Wed 10/18/2023, Normal      METRONIDAZOLE PO Take by mouth, Historical Med      naproxen (NAPROSYN) 500 mg tablet Take 1 tablet (500 mg total) by mouth 2 (two) times a day with meals, Starting Sat 10/17/2020, Print      ondansetron (ZOFRAN-ODT) 4 mg disintegrating tablet Take 1 tablet (4 mg total) by mouth every 6 (six) hours as needed for nausea or vomiting, Starting Sat 10/17/2020, Print      !! predniSONE 20 mg tablet Take 2 tablets (40 mg total) by mouth daily Do not start before October 11, 2023., Starting Wed 10/11/2023, Print      !! predniSONE 50 mg tablet Take 1 tablet (50 mg total) by mouth daily, Starting Mon 11/27/2023, Print       !! - Potential duplicate medications found. Please discuss with provider.          No discharge procedures on file.    PDMP Review       None            ED Provider  Electronically Signed by             Chandrakant Bojorquez MD  01/30/24 [id number]

## 2025-02-28 ENCOUNTER — HOSPITAL ENCOUNTER (EMERGENCY)
Facility: HOSPITAL | Age: 55
Discharge: HOME | End: 2025-02-28
Attending: EMERGENCY MEDICINE
Payer: COMMERCIAL

## 2025-02-28 ENCOUNTER — APPOINTMENT (EMERGENCY)
Dept: CT IMAGING | Facility: HOSPITAL | Age: 55
End: 2025-02-28
Payer: COMMERCIAL

## 2025-02-28 VITALS
DIASTOLIC BLOOD PRESSURE: 83 MMHG | RESPIRATION RATE: 17 BRPM | HEART RATE: 76 BPM | SYSTOLIC BLOOD PRESSURE: 158 MMHG | OXYGEN SATURATION: 96 % | TEMPERATURE: 97.3 F | WEIGHT: 181 LBS

## 2025-02-28 DIAGNOSIS — R10.9 ABDOMINAL PAIN: Primary | ICD-10-CM

## 2025-02-28 LAB
2HR DELTA HS TROPONIN: 2 NG/L
ALBUMIN SERPL BCG-MCNC: 4.3 G/DL (ref 3.5–5)
ALP SERPL-CCNC: 85 U/L (ref 34–104)
ALT SERPL W P-5'-P-CCNC: 48 U/L (ref 7–52)
ANION GAP SERPL CALCULATED.3IONS-SCNC: 8 MMOL/L (ref 4–13)
APTT PPP: 27 SECONDS (ref 23–34)
AST SERPL W P-5'-P-CCNC: 31 U/L (ref 13–39)
ATRIAL RATE: 79 BPM
BASOPHILS # BLD AUTO: 0.04 THOUSANDS/ÂΜL (ref 0–0.1)
BASOPHILS NFR BLD AUTO: 1 % (ref 0–1)
BILIRUB SERPL-MCNC: 0.37 MG/DL (ref 0.2–1)
BUN SERPL-MCNC: 12 MG/DL (ref 5–25)
CALCIUM SERPL-MCNC: 9.2 MG/DL (ref 8.4–10.2)
CARDIAC TROPONIN I PNL SERPL HS: 3 NG/L (ref ?–50)
CARDIAC TROPONIN I PNL SERPL HS: 5 NG/L (ref ?–50)
CHLORIDE SERPL-SCNC: 103 MMOL/L (ref 96–108)
CO2 SERPL-SCNC: 24 MMOL/L (ref 21–32)
CREAT SERPL-MCNC: 0.62 MG/DL (ref 0.6–1.3)
EOSINOPHIL # BLD AUTO: 0.32 THOUSAND/ÂΜL (ref 0–0.61)
EOSINOPHIL NFR BLD AUTO: 4 % (ref 0–6)
ERYTHROCYTE [DISTWIDTH] IN BLOOD BY AUTOMATED COUNT: 13 % (ref 11.6–15.1)
GFR SERPL CREATININE-BSD FRML MDRD: 103 ML/MIN/1.73SQ M
GLUCOSE SERPL-MCNC: 158 MG/DL (ref 65–140)
HCT VFR BLD AUTO: 41 % (ref 34.8–46.1)
HGB BLD-MCNC: 13.2 G/DL (ref 11.5–15.4)
IMM GRANULOCYTES # BLD AUTO: 0.02 THOUSAND/UL (ref 0–0.2)
IMM GRANULOCYTES NFR BLD AUTO: 0 % (ref 0–2)
INR PPP: 0.95 (ref 0.85–1.19)
LIPASE SERPL-CCNC: 12 U/L (ref 11–82)
LYMPHOCYTES # BLD AUTO: 1.94 THOUSANDS/ÂΜL (ref 0.6–4.47)
LYMPHOCYTES NFR BLD AUTO: 25 % (ref 14–44)
MCH RBC QN AUTO: 26.8 PG (ref 26.8–34.3)
MCHC RBC AUTO-ENTMCNC: 32.2 G/DL (ref 31.4–37.4)
MCV RBC AUTO: 83 FL (ref 82–98)
MONOCYTES # BLD AUTO: 0.51 THOUSAND/ÂΜL (ref 0.17–1.22)
MONOCYTES NFR BLD AUTO: 7 % (ref 4–12)
NEUTROPHILS # BLD AUTO: 4.99 THOUSANDS/ÂΜL (ref 1.85–7.62)
NEUTS SEG NFR BLD AUTO: 63 % (ref 43–75)
NRBC BLD AUTO-RTO: 0 /100 WBCS
P AXIS: 25 DEGREES
PLATELET # BLD AUTO: 253 THOUSANDS/UL (ref 149–390)
PMV BLD AUTO: 10.2 FL (ref 8.9–12.7)
POTASSIUM SERPL-SCNC: 3.8 MMOL/L (ref 3.5–5.3)
PR INTERVAL: 148 MS
PROT SERPL-MCNC: 7.4 G/DL (ref 6.4–8.4)
PROTHROMBIN TIME: 13.4 SECONDS (ref 12.3–15)
QRS AXIS: 42 DEGREES
QRSD INTERVAL: 74 MS
QT INTERVAL: 364 MS
QTC INTERVAL: 417 MS
RBC # BLD AUTO: 4.92 MILLION/UL (ref 3.81–5.12)
SODIUM SERPL-SCNC: 135 MMOL/L (ref 135–147)
T WAVE AXIS: -13 DEGREES
VENTRICULAR RATE: 79 BPM
WBC # BLD AUTO: 7.82 THOUSAND/UL (ref 4.31–10.16)

## 2025-02-28 PROCEDURE — 99284 EMERGENCY DEPT VISIT MOD MDM: CPT | Performed by: EMERGENCY MEDICINE

## 2025-02-28 PROCEDURE — 99284 EMERGENCY DEPT VISIT MOD MDM: CPT

## 2025-02-28 PROCEDURE — 36415 COLL VENOUS BLD VENIPUNCTURE: CPT

## 2025-02-28 PROCEDURE — 93005 ELECTROCARDIOGRAM TRACING: CPT

## 2025-02-28 PROCEDURE — 85730 THROMBOPLASTIN TIME PARTIAL: CPT | Performed by: EMERGENCY MEDICINE

## 2025-02-28 PROCEDURE — 83690 ASSAY OF LIPASE: CPT

## 2025-02-28 PROCEDURE — 80053 COMPREHEN METABOLIC PANEL: CPT

## 2025-02-28 PROCEDURE — 93010 ELECTROCARDIOGRAM REPORT: CPT | Performed by: INTERNAL MEDICINE

## 2025-02-28 PROCEDURE — 85025 COMPLETE CBC W/AUTO DIFF WBC: CPT

## 2025-02-28 PROCEDURE — 74177 CT ABD & PELVIS W/CONTRAST: CPT

## 2025-02-28 PROCEDURE — 84484 ASSAY OF TROPONIN QUANT: CPT

## 2025-02-28 PROCEDURE — 85610 PROTHROMBIN TIME: CPT | Performed by: EMERGENCY MEDICINE

## 2025-02-28 RX ADMIN — IOHEXOL 100 ML: 350 INJECTION, SOLUTION INTRAVENOUS at 19:05

## 2025-03-06 NOTE — ED PROVIDER NOTES
Time reflects when diagnosis was documented in both MDM as applicable and the Disposition within this note       Time User Action Codes Description Comment    2/28/2025  8:33 PM Chandrakant Bojorquez Add [R10.9] Abdominal pain           ED Disposition       ED Disposition   Discharge    Condition   Stable    Date/Time   Fri Feb 28, 2025  8:32 PM    Comment   Zo Pinedo discharge to home/self care.                   Assessment & Plan       Medical Decision Making  54-year-old female with abdominal pain.  Very possibly related to Ozempic will check labs, CT, reassess.    Amount and/or Complexity of Data Reviewed  Labs: ordered.  Radiology: ordered.    Risk  Prescription drug management.             Medications   iohexol (OMNIPAQUE) 350 MG/ML injection (MULTI-DOSE) 100 mL (100 mL Intravenous Given 2/28/25 1905)       ED Risk Strat Scores                                                History of Present Illness       Chief Complaint   Patient presents with    Abdominal Pain     Periumbilical pain intermittently for the past 3 weeks, with bright red blood from their rectum reports hx of hemorrhoids with diarrhea. Patient reports nausea with intermittent vomiting. Decreased oral intake due to symptoms. Recently started on ozempic 3 months ago, and increased the dose 3 weeks ago.        Past Medical History:   Diagnosis Date    Asthma     Diabetes mellitus (HCC)     Diverticulitis     Hypertension       History reviewed. No pertinent surgical history.   History reviewed. No pertinent family history.   Social History     Tobacco Use    Smoking status: Former     Current packs/day: 1.00     Types: Cigarettes    Smokeless tobacco: Never   Vaping Use    Vaping status: Never Used   Substance Use Topics    Alcohol use: Not Currently    Drug use: Not Currently      E-Cigarette/Vaping    E-Cigarette Use Never User       E-Cigarette/Vaping Substances      I have reviewed and agree with the history as documented.     54-year-old female  presenting to the emergency department for evaluation of abdominal pain.  Patient reports intermittent abdominal pain for the past 3 days associated with some blood in the stool.  Patient does have history of hemorrhoids.  Also has had some nausea vomiting and poor p.o. intake.  Recently started Ozempic.  Recently increased the dose and this is about the time that her symptoms started        Review of Systems   Constitutional:  Negative for appetite change, chills, fatigue and fever.   HENT:  Negative for sneezing and sore throat.    Eyes:  Negative for visual disturbance.   Respiratory:  Negative for cough, choking, chest tightness, shortness of breath and wheezing.    Cardiovascular:  Negative for chest pain and palpitations.   Gastrointestinal:  Positive for abdominal pain, nausea and vomiting. Negative for constipation and diarrhea.   Genitourinary:  Negative for difficulty urinating and dysuria.   Neurological:  Negative for dizziness, weakness, light-headedness, numbness and headaches.   All other systems reviewed and are negative.          Objective       ED Triage Vitals [02/28/25 1638]   Temperature Pulse Blood Pressure Respirations SpO2 Patient Position - Orthostatic VS   (!) 97.3 °F (36.3 °C) 78 (!) 173/77 17 98 % Sitting      Temp Source Heart Rate Source BP Location FiO2 (%) Pain Score    Temporal Monitor Left arm -- --      Vitals      Date and Time Temp Pulse SpO2 Resp BP Pain Score FACES Pain Rating User   02/28/25 1715 -- 76 96 % 17 158/83 -- -- FB   02/28/25 1638 97.3 °F (36.3 °C) 78 98 % 17 173/77 -- -- AC            Physical Exam  Vitals and nursing note reviewed.   Constitutional:       General: She is not in acute distress.     Appearance: She is well-developed. She is not diaphoretic.   HENT:      Head: Normocephalic and atraumatic.   Eyes:      Pupils: Pupils are equal, round, and reactive to light.   Neck:      Vascular: No JVD.      Trachea: No tracheal deviation.   Cardiovascular:       Rate and Rhythm: Normal rate and regular rhythm.      Heart sounds: Normal heart sounds. No murmur heard.     No friction rub. No gallop.   Pulmonary:      Effort: Pulmonary effort is normal. No respiratory distress.      Breath sounds: Normal breath sounds. No wheezing or rales.   Abdominal:      General: Bowel sounds are normal. There is no distension.      Palpations: Abdomen is soft.      Tenderness: There is no abdominal tenderness. There is no guarding or rebound.   Skin:     General: Skin is warm and dry.      Coloration: Skin is not pale.   Neurological:      Mental Status: She is alert and oriented to person, place, and time.      Cranial Nerves: No cranial nerve deficit.      Motor: No abnormal muscle tone.   Psychiatric:         Behavior: Behavior normal.         Results Reviewed       Procedure Component Value Units Date/Time    HS Troponin I 2hr [[id number]]  (Normal) Collected: 02/28/25 1844    Lab Status: Final result Specimen: Blood from Arm, Left Updated: 02/28/25 1913     hs TnI 2hr 5 ng/L      Delta 2hr hsTnI 2 ng/L     Protime-INR [[id number]]  (Normal) Collected: 02/28/25 1748    Lab Status: Final result Specimen: Blood from Arm, Left Updated: 02/28/25 1807     Protime 13.4 seconds      INR 0.95    Narrative:      INR Therapeutic Range    Indication                                             INR Range      Atrial Fibrillation                                               2.0-3.0  Hypercoagulable State                                    2.0.2.3  Left Ventricular Asist Device                            2.0-3.0  Mechanical Heart Valve                                  -    Aortic(with afib, MI, embolism, HF, LA enlargement,    and/or coagulopathy)                                     2.0-3.0 (2.5-3.5)     Mitral                                                             2.5-3.5  Prosthetic/Bioprosthetic Heart Valve               2.0-3.0  Venous thromboembolism (VTE: VT, PE        2.0-3.0    APTT  [[id number]]  (Normal) Collected: 02/28/25 1748    Lab Status: Final result Specimen: Blood from Arm, Left Updated: 02/28/25 1807     PTT 27 seconds     HS Troponin 0hr (reflex protocol) [[id number]]  (Normal) Collected: 02/28/25 1641    Lab Status: Final result Specimen: Blood from Arm, Left Updated: 02/28/25 1719     hs TnI 0hr 3 ng/L     Lipase [[id number]]  (Normal) Collected: 02/28/25 1641    Lab Status: Final result Specimen: Blood from Arm, Left Updated: 02/28/25 1713     Lipase 12 u/L     Comprehensive metabolic panel [[id number]]  (Abnormal) Collected: 02/28/25 1641    Lab Status: Final result Specimen: Blood from Arm, Left Updated: 02/28/25 1713     Sodium 135 mmol/L      Potassium 3.8 mmol/L      Chloride 103 mmol/L      CO2 24 mmol/L      ANION GAP 8 mmol/L      BUN 12 mg/dL      Creatinine 0.62 mg/dL      Glucose 158 mg/dL      Calcium 9.2 mg/dL      AST 31 U/L      ALT 48 U/L      Alkaline Phosphatase 85 U/L      Total Protein 7.4 g/dL      Albumin 4.3 g/dL      Total Bilirubin 0.37 mg/dL      eGFR 103 ml/min/1.73sq m     Narrative:      [employer] guidelines for Chronic Kidney Disease (CKD):     Stage 1 with normal or high GFR (GFR > 90 mL/min/1.73 square meters)    Stage 2 Mild CKD (GFR = 60-89 mL/min/1.73 square meters)    Stage 3A Moderate CKD (GFR = 45-59 mL/min/1.73 square meters)    Stage 3B Moderate CKD (GFR = 30-44 mL/min/1.73 square meters)    Stage 4 Severe CKD (GFR = 15-29 mL/min/1.73 square meters)    Stage 5 End Stage CKD (GFR <15 mL/min/1.73 square meters)  Note: GFR calculation is accurate only with a steady state creatinine    CBC and differential [[id number]] Collected: 02/28/25 1641    Lab Status: Final result Specimen: Blood from Arm, Left Updated: 02/28/25 1653     WBC 7.82 Thousand/uL      RBC 4.92 Million/uL      Hemoglobin 13.2 g/dL      Hematocrit 41.0 %      MCV 83 fL      MCH 26.8 pg      MCHC 32.2 g/dL      RDW 13.0 %      MPV 10.2 fL      Platelets 253  Thousands/uL      nRBC 0 /100 WBCs      Segmented % 63 %      Immature Grans % 0 %      Lymphocytes % 25 %      Monocytes % 7 %      Eosinophils Relative 4 %      Basophils Relative 1 %      Absolute Neutrophils 4.99 Thousands/µL      Absolute Immature Grans 0.02 Thousand/uL      Absolute Lymphocytes 1.94 Thousands/µL      Absolute Monocytes 0.51 Thousand/µL      Eosinophils Absolute 0.32 Thousand/µL      Basophils Absolute 0.04 Thousands/µL             CT abdomen pelvis with contrast   Final Interpretation by Ramirez Hahn MD (02/28 2015)      No acute abdominopelvic abnormality identified. No free air or free fluid.      Subcutaneous infiltrative changes with overlying skin thickening at the inferior right breast/chest wall, nonspecific, possibly postsurgical related to interval mastectomy and/or infectious/inflammatory. No discrete soft tissue fluid collection seen.    Similar findings reported on prior outside study dated 12/16/2024 although no images available for comparison. Clinical correlation recommended.         Workstation performed: [id number]             Procedures    ED Medication and Procedure Management   Prior to Admission Medications   Prescriptions Last Dose Informant Patient Reported? Taking?   CIPROFLOXACIN PO   Yes No   Sig: Take by mouth   METRONIDAZOLE PO   Yes No   Sig: Take by mouth   albuterol (ProAir HFA) 90 mcg/act inhaler   No No   Sig: Inhale 2 puffs every 6 (six) hours as needed for wheezing   ketorolac (TORADOL) 10 mg tablet   No No   Sig: Take 1 tablet (10 mg total) by mouth every 6 (six) hours as needed for moderate pain   metFORMIN (GLUCOPHAGE) 500 mg tablet   Yes No   Sig: Take 500 mg by mouth 2 (two) times a day with meals   methocarbamol (ROBAXIN) 500 mg tablet   No No   Sig: Take 1 tablet (500 mg total) by mouth 3 (three) times a day as needed for muscle spasms   naproxen (NAPROSYN) 500 mg tablet   No No   Sig: Take 1 tablet (500 mg total) by mouth 2 (two) times a day with  meals   ondansetron (ZOFRAN-ODT) 4 mg disintegrating tablet   No No   Sig: Take 1 tablet (4 mg total) by mouth every 6 (six) hours as needed for nausea or vomiting   predniSONE 20 mg tablet   No No   Sig: Take 2 tablets (40 mg total) by mouth daily Do not start before October 11, 2023.   predniSONE 50 mg tablet   No No   Sig: Take 1 tablet (50 mg total) by mouth daily      Facility-Administered Medications: None     Discharge Medication List as of 2/28/2025  8:36 PM        CONTINUE these medications which have NOT CHANGED    Details   albuterol (ProAir HFA) 90 mcg/act inhaler Inhale 2 puffs every 6 (six) hours as needed for wheezing, Starting Mon 1/15/2024, Print      CIPROFLOXACIN PO Take by mouth, Historical Med      ketorolac (TORADOL) 10 mg tablet Take 1 tablet (10 mg total) by mouth every 6 (six) hours as needed for moderate pain, Starting Mon 11/27/2023, Print      metFORMIN (GLUCOPHAGE) 500 mg tablet Take 500 mg by mouth 2 (two) times a day with meals, Historical Med      methocarbamol (ROBAXIN) 500 mg tablet Take 1 tablet (500 mg total) by mouth 3 (three) times a day as needed for muscle spasms, Starting Wed 10/18/2023, Normal      METRONIDAZOLE PO Take by mouth, Historical Med      naproxen (NAPROSYN) 500 mg tablet Take 1 tablet (500 mg total) by mouth 2 (two) times a day with meals, Starting Sat 10/17/2020, Print      ondansetron (ZOFRAN-ODT) 4 mg disintegrating tablet Take 1 tablet (4 mg total) by mouth every 6 (six) hours as needed for nausea or vomiting, Starting Sat 10/17/2020, Print      !! predniSONE 20 mg tablet Take 2 tablets (40 mg total) by mouth daily Do not start before October 11, 2023., Starting Wed 10/11/2023, Print      !! predniSONE 50 mg tablet Take 1 tablet (50 mg total) by mouth daily, Starting Mon 11/27/2023, Print       !! - Potential duplicate medications found. Please discuss with provider.        No discharge procedures on file.  ED SEPSIS DOCUMENTATION   Time reflects when  diagnosis was documented in both MDM as applicable and the Disposition within this note       Time User Action Codes Description Comment    2/28/2025  8:33 PM Chandrakant Bojorquez Add [R10.9] Abdominal pain                  Chandrakant Bojorquez MD  03/06/25 0512

## 2025-06-30 ENCOUNTER — OFFICE VISIT (OUTPATIENT)
Dept: URGENT CARE | Facility: CLINIC | Age: 55
End: 2025-06-30
Payer: COMMERCIAL

## 2025-06-30 ENCOUNTER — APPOINTMENT (OUTPATIENT)
Dept: RADIOLOGY | Facility: CLINIC | Age: 55
End: 2025-06-30
Attending: PHYSICIAN ASSISTANT
Payer: COMMERCIAL

## 2025-06-30 VITALS
RESPIRATION RATE: 18 BRPM | HEART RATE: 86 BPM | TEMPERATURE: 97.7 F | SYSTOLIC BLOOD PRESSURE: 154 MMHG | WEIGHT: 181 LBS | DIASTOLIC BLOOD PRESSURE: 90 MMHG | OXYGEN SATURATION: 99 %

## 2025-06-30 DIAGNOSIS — R07.81 RIB PAIN ON RIGHT SIDE: ICD-10-CM

## 2025-06-30 DIAGNOSIS — S39.011A STRAIN OF MUSCLE, FASCIA AND TENDON OF ABDOMEN, INITIAL ENCOUNTER: Primary | ICD-10-CM

## 2025-06-30 PROCEDURE — 71101 X-RAY EXAM UNILAT RIBS/CHEST: CPT

## 2025-06-30 PROCEDURE — 96372 THER/PROPH/DIAG INJ SC/IM: CPT | Performed by: PHYSICIAN ASSISTANT

## 2025-06-30 PROCEDURE — 99203 OFFICE O/P NEW LOW 30 MIN: CPT | Performed by: PHYSICIAN ASSISTANT

## 2025-06-30 RX ORDER — KETOROLAC TROMETHAMINE 30 MG/ML
30 INJECTION, SOLUTION INTRAMUSCULAR; INTRAVENOUS ONCE
Status: COMPLETED | OUTPATIENT
Start: 2025-06-30 | End: 2025-06-30

## 2025-06-30 RX ADMIN — KETOROLAC TROMETHAMINE 30 MG: 30 INJECTION, SOLUTION INTRAMUSCULAR; INTRAVENOUS at 19:34

## 2025-06-30 NOTE — PROGRESS NOTES
Patient Name: Zo Pinedo      : 1970      MRN: [id number]  Encounter Provider: Emmy Weller PA-C  Encounter Date: 2025  Encounter department: [facility] (formerly [facility])    Assessment & Plan  Strain of muscle, fascia and tendon of abdomen, initial encounter  X-ray of the right sided rib provider read-no obvious acute fracture or abnormality.    Discussed with patient how she likely strained a muscle on her right side/abdomen.  I would recommend ice and over-the-counter pain medication use as needed.    Shot of Toradol given today in office for pain.  She can continue with Tylenol as needed tonight.  Orders:    ketorolac (TORADOL) injection 30 mg    Rib pain on right side    Orders:    XR ribs right w pa chest min 3 views; Future          Patient Instructions:   Patient Instructions     Apply cold compresses intermittently as needed.  As pain recedes, begin normal activities slowly as tolerated.          Follow up with PCP in 3-5 days.  Proceed to  ER if symptoms worsen.    If tests are performed, our office will contact you with results only if changes need to made to the care plan discussed with you at the visit. You can review your full results on [facility].     Subjective   Chief Complaint   Patient presents with    Abdominal Pain     Pt here for abdominal pain and was laying by pool on concrete went to move to get up and felt click and can't breathe cough move doesn't know what did by right lower ribs         Zo Pinedo is a 54 y.o.female  Patient presents for evaluation of right sided abdominal/rib pain.  States she was laying on her side on concrete when she moved to get up she felt a click in her side and had significant pain.  She states that it is painful to cough, breathe, and move.  She is not sure if she broke anything or what exactly she did that area.        Review of Systems   Gastrointestinal:  Positive for abdominal pain. Negative for abdominal  distention, anal bleeding, blood in stool, constipation, diarrhea, nausea, rectal pain and vomiting.   Musculoskeletal:  Positive for arthralgias and myalgias.   All other systems reviewed and are negative.      Current Medications[1]  Allergies as of 06/30/2025    (No Known Allergies)     Past Medical History[2]  Past Surgical History[3]  Family History[4]     Objective   /90   Pulse 86   Temp 97.7 °F (36.5 °C) (Tympanic)   Resp 18   Wt 82.1 kg (181 lb)   LMP  (LMP Unknown)   SpO2 99%      Physical Exam  Vitals and nursing note reviewed.   Constitutional:       Appearance: Normal appearance.     Cardiovascular:      Rate and Rhythm: Normal rate and regular rhythm.   Pulmonary:      Effort: Pulmonary effort is normal.      Breath sounds: Normal breath sounds.   Chest:      Chest wall: Tenderness present. No mass, deformity or swelling.     Abdominal:      General: Abdomen is flat. Bowel sounds are normal.      Palpations: Abdomen is soft.      Tenderness: There is abdominal tenderness (only slight tenderness into the RUQ) in the right upper quadrant.     Skin:     General: Skin is warm and dry.      Capillary Refill: Capillary refill takes less than 2 seconds.     Neurological:      General: No focal deficit present.      Mental Status: She is alert and oriented to person, place, and time.     Psychiatric:         Mood and Affect: Mood normal.         Behavior: Behavior normal.            [1]   Current Outpatient Medications:     albuterol (ProAir HFA) 90 mcg/act inhaler, Inhale 2 puffs every 6 (six) hours as needed for wheezing, Disp: 8.5 g, Rfl: 0    CIPROFLOXACIN PO, Take by mouth, Disp: , Rfl:     ketorolac (TORADOL) 10 mg tablet, Take 1 tablet (10 mg total) by mouth every 6 (six) hours as needed for moderate pain, Disp: 15 tablet, Rfl: 0    metFORMIN (GLUCOPHAGE) 500 mg tablet, Take 500 mg by mouth in the morning and 500 mg in the evening. Take with meals., Disp: , Rfl:     methocarbamol (ROBAXIN)  500 mg tablet, Take 1 tablet (500 mg total) by mouth 3 (three) times a day as needed for muscle spasms, Disp: 30 tablet, Rfl: 0    METRONIDAZOLE PO, Take by mouth, Disp: , Rfl:     naproxen (NAPROSYN) 500 mg tablet, Take 1 tablet (500 mg total) by mouth 2 (two) times a day with meals, Disp: 20 tablet, Rfl: 0    ondansetron (ZOFRAN-ODT) 4 mg disintegrating tablet, Take 1 tablet (4 mg total) by mouth every 6 (six) hours as needed for nausea or vomiting, Disp: 20 tablet, Rfl: 0    predniSONE 20 mg tablet, Take 2 tablets (40 mg total) by mouth daily Do not start before October 11, 2023., Disp: 10 tablet, Rfl: 0    predniSONE 50 mg tablet, Take 1 tablet (50 mg total) by mouth daily, Disp: 5 tablet, Rfl: 0  No current facility-administered medications for this visit.  [2]   Past Medical History:  Diagnosis Date    Asthma     Diabetes mellitus (HCC)     Diverticulitis     Hypertension    [3] No past surgical history on file.  [4] No family history on file.

## 2025-06-30 NOTE — PATIENT INSTRUCTIONS
Apply cold compresses intermittently as needed.  As pain recedes, begin normal activities slowly as tolerated.          Follow up with PCP in 3-5 days.  Proceed to  ER if symptoms worsen.    If tests are performed, our office will contact you with results only if changes need to made to the care plan discussed with you at the visit. You can review your full results on [facility].

## 2025-08-22 ENCOUNTER — OFFICE VISIT (OUTPATIENT)
Dept: URGENT CARE | Facility: CLINIC | Age: 55
End: 2025-08-22
Payer: COMMERCIAL

## 2025-08-22 VITALS
RESPIRATION RATE: 18 BRPM | SYSTOLIC BLOOD PRESSURE: 144 MMHG | DIASTOLIC BLOOD PRESSURE: 84 MMHG | TEMPERATURE: 97.4 F | OXYGEN SATURATION: 97 % | HEART RATE: 85 BPM

## 2025-08-22 DIAGNOSIS — L03.313 CELLULITIS OF CHEST WALL: Primary | ICD-10-CM

## 2025-08-22 PROCEDURE — 99213 OFFICE O/P EST LOW 20 MIN: CPT | Performed by: NURSE PRACTITIONER

## 2025-08-22 RX ORDER — IBUPROFEN 800 MG/1
800 TABLET, FILM COATED ORAL EVERY 6 HOURS PRN
COMMUNITY

## 2025-08-22 RX ORDER — HYDROXYZINE HYDROCHLORIDE 25 MG/1
25 TABLET, FILM COATED ORAL EVERY 6 HOURS PRN
COMMUNITY

## 2025-08-22 RX ORDER — CEPHALEXIN 500 MG/1
500 CAPSULE ORAL EVERY 12 HOURS SCHEDULED
Qty: 20 CAPSULE | Refills: 0 | Status: SHIPPED | OUTPATIENT
Start: 2025-08-22 | End: 2025-09-01

## 2025-08-22 RX ORDER — AMLODIPINE BESYLATE 5 MG/1
5 TABLET ORAL DAILY
COMMUNITY
Start: 2025-03-06 | End: 2026-03-06

## 2025-08-22 RX ORDER — SEMAGLUTIDE 2.68 MG/ML
2 INJECTION, SOLUTION SUBCUTANEOUS
COMMUNITY